# Patient Record
Sex: FEMALE | Race: BLACK OR AFRICAN AMERICAN | NOT HISPANIC OR LATINO | Employment: STUDENT | URBAN - METROPOLITAN AREA
[De-identification: names, ages, dates, MRNs, and addresses within clinical notes are randomized per-mention and may not be internally consistent; named-entity substitution may affect disease eponyms.]

---

## 2021-04-26 ENCOUNTER — PATIENT MESSAGE (OUTPATIENT)
Dept: RESEARCH | Facility: HOSPITAL | Age: 41
End: 2021-04-26

## 2021-07-16 ENCOUNTER — OFFICE VISIT (OUTPATIENT)
Dept: OBSTETRICS AND GYNECOLOGY | Facility: CLINIC | Age: 41
End: 2021-07-16
Payer: MEDICAID

## 2021-07-16 VITALS
WEIGHT: 265.31 LBS | SYSTOLIC BLOOD PRESSURE: 140 MMHG | DIASTOLIC BLOOD PRESSURE: 88 MMHG | BODY MASS INDEX: 40.34 KG/M2

## 2021-07-16 DIAGNOSIS — Z01.419 ENCOUNTER FOR WELL WOMAN EXAM WITH ROUTINE GYNECOLOGICAL EXAM: Primary | ICD-10-CM

## 2021-07-16 DIAGNOSIS — N93.0 BLEEDING AFTER INTERCOURSE: ICD-10-CM

## 2021-07-16 DIAGNOSIS — N94.6 DYSMENORRHEA: ICD-10-CM

## 2021-07-16 PROCEDURE — 99213 OFFICE O/P EST LOW 20 MIN: CPT | Mod: PBBFAC,PN | Performed by: NURSE PRACTITIONER

## 2021-07-16 PROCEDURE — 99386 PR PREVENTIVE VISIT,NEW,40-64: ICD-10-PCS | Mod: S$PBB,,, | Performed by: NURSE PRACTITIONER

## 2021-07-16 PROCEDURE — 99999 PR PBB SHADOW E&M-EST. PATIENT-LVL III: ICD-10-PCS | Mod: PBBFAC,,, | Performed by: NURSE PRACTITIONER

## 2021-07-16 PROCEDURE — 99999 PR PBB SHADOW E&M-EST. PATIENT-LVL III: CPT | Mod: PBBFAC,,, | Performed by: NURSE PRACTITIONER

## 2021-07-16 PROCEDURE — 99386 PREV VISIT NEW AGE 40-64: CPT | Mod: S$PBB,,, | Performed by: NURSE PRACTITIONER

## 2021-07-16 RX ORDER — MELOXICAM 15 MG/1
15 TABLET ORAL DAILY
Status: ON HOLD | COMMUNITY
Start: 2021-05-20 | End: 2022-09-15

## 2021-07-16 RX ORDER — KETOCONAZOLE 20 MG/ML
SHAMPOO, SUSPENSION TOPICAL
COMMUNITY
Start: 2021-03-01

## 2021-07-16 RX ORDER — ESCITALOPRAM OXALATE 10 MG/1
10 TABLET ORAL DAILY
COMMUNITY
Start: 2021-03-27 | End: 2024-01-25

## 2021-07-16 RX ORDER — IBUPROFEN 800 MG/1
800 TABLET ORAL EVERY 8 HOURS PRN
Qty: 60 TABLET | Refills: 1 | Status: SHIPPED | OUTPATIENT
Start: 2021-07-16 | End: 2022-07-16

## 2021-08-12 ENCOUNTER — HOSPITAL ENCOUNTER (EMERGENCY)
Facility: HOSPITAL | Age: 41
Discharge: HOME | End: 2021-08-12
Attending: EMERGENCY MEDICINE
Payer: MEDICAID

## 2021-08-12 VITALS
BODY MASS INDEX: 41.65 KG/M2 | DIASTOLIC BLOOD PRESSURE: 83 MMHG | HEIGHT: 65 IN | OXYGEN SATURATION: 98 % | TEMPERATURE: 98 F | HEART RATE: 102 BPM | SYSTOLIC BLOOD PRESSURE: 133 MMHG | RESPIRATION RATE: 22 BRPM | WEIGHT: 250 LBS

## 2021-08-12 DIAGNOSIS — J00 ACUTE NASOPHARYNGITIS: Primary | ICD-10-CM

## 2021-08-12 LAB
CTP QC/QA: YES
SARS-COV-2 RDRP RESP QL NAA+PROBE: NEGATIVE

## 2021-08-12 PROCEDURE — 99283 EMERGENCY DEPT VISIT LOW MDM: CPT | Mod: 25

## 2021-08-12 PROCEDURE — 99282 PR EMERGENCY DEPT VISIT,LEVEL II: ICD-10-PCS | Mod: CS,,, | Performed by: PHYSICIAN ASSISTANT

## 2021-08-12 PROCEDURE — 99282 EMERGENCY DEPT VISIT SF MDM: CPT | Mod: CS,,, | Performed by: PHYSICIAN ASSISTANT

## 2021-08-12 PROCEDURE — U0002 COVID-19 LAB TEST NON-CDC: HCPCS | Performed by: EMERGENCY MEDICINE

## 2021-08-12 RX ORDER — BENZONATATE 100 MG/1
100 CAPSULE ORAL 3 TIMES DAILY PRN
Qty: 20 CAPSULE | Refills: 0 | Status: SHIPPED | OUTPATIENT
Start: 2021-08-12 | End: 2021-08-22

## 2022-01-08 ENCOUNTER — OFFICE VISIT (OUTPATIENT)
Dept: URGENT CARE | Facility: CLINIC | Age: 42
End: 2022-01-08

## 2022-01-08 VITALS
WEIGHT: 250 LBS | DIASTOLIC BLOOD PRESSURE: 88 MMHG | HEIGHT: 65 IN | SYSTOLIC BLOOD PRESSURE: 114 MMHG | RESPIRATION RATE: 18 BRPM | BODY MASS INDEX: 41.65 KG/M2 | HEART RATE: 77 BPM | TEMPERATURE: 98 F | OXYGEN SATURATION: 97 %

## 2022-01-08 DIAGNOSIS — N76.0 VAGINOSIS: ICD-10-CM

## 2022-01-08 DIAGNOSIS — N89.8 VAGINAL ITCHING: Primary | ICD-10-CM

## 2022-01-08 LAB
BILIRUB UR QL STRIP: NEGATIVE
GLUCOSE UR QL STRIP: NEGATIVE
KETONES UR QL STRIP: NEGATIVE
LEUKOCYTE ESTERASE UR QL STRIP: NEGATIVE
PH, POC UA: 5 (ref 5–8)
POC BLOOD, URINE: POSITIVE
POC NITRATES, URINE: NEGATIVE
PROT UR QL STRIP: NEGATIVE
SP GR UR STRIP: 1.02 (ref 1–1.03)
UROBILINOGEN UR STRIP-ACNC: NORMAL (ref 0.1–1.1)

## 2022-01-08 PROCEDURE — 87481 CANDIDA DNA AMP PROBE: CPT | Mod: 59 | Performed by: NURSE PRACTITIONER

## 2022-01-08 PROCEDURE — 99204 OFFICE O/P NEW MOD 45 MIN: CPT | Mod: TIER,25,S$GLB, | Performed by: NURSE PRACTITIONER

## 2022-01-08 PROCEDURE — 87491 CHLMYD TRACH DNA AMP PROBE: CPT | Mod: 59 | Performed by: NURSE PRACTITIONER

## 2022-01-08 PROCEDURE — 87591 N.GONORRHOEAE DNA AMP PROB: CPT | Mod: 59 | Performed by: NURSE PRACTITIONER

## 2022-01-08 PROCEDURE — 87801 DETECT AGNT MULT DNA AMPLI: CPT | Performed by: NURSE PRACTITIONER

## 2022-01-08 PROCEDURE — 81003 URINALYSIS AUTO W/O SCOPE: CPT | Mod: QW,S$GLB,, | Performed by: NURSE PRACTITIONER

## 2022-01-08 PROCEDURE — 87086 URINE CULTURE/COLONY COUNT: CPT | Performed by: NURSE PRACTITIONER

## 2022-01-08 PROCEDURE — 81003 POCT URINALYSIS, DIPSTICK, MANUAL, W/O SCOPE: ICD-10-PCS | Mod: QW,S$GLB,, | Performed by: NURSE PRACTITIONER

## 2022-01-08 PROCEDURE — 99204 PR OFFICE/OUTPT VISIT, NEW, LEVL IV, 45-59 MIN: ICD-10-PCS | Mod: TIER,25,S$GLB, | Performed by: NURSE PRACTITIONER

## 2022-01-08 NOTE — PROGRESS NOTES
"Subjective:       Patient ID: Jerome Luke is a 41 y.o. female.    Vitals:  height is 5' 5" (1.651 m) and weight is 113.4 kg (250 lb). Her temperature is 97.8 °F (36.6 °C). Her blood pressure is 114/88 and her pulse is 77. Her respiration is 18 and oxygen saturation is 97%.     Chief Complaint: Vaginal Itching    Period came down with severe irritation and itching.    Vaginal Itching  The patient's primary symptoms include genital itching and vaginal discharge. Episode onset: 2 weeks ago. The problem has been unchanged. The patient is experiencing no pain. Pertinent negatives include no abdominal pain, chills, diarrhea, fever, headaches, nausea or vomiting. Nothing aggravates the symptoms. She has tried nothing for the symptoms.       Constitution: Negative for chills, sweating, fatigue, fever and generalized weakness.   Cardiovascular: Negative for chest pain, palpitations and sob on exertion.   Respiratory: Negative for chest tightness, cough, shortness of breath and wheezing.    Gastrointestinal: Negative for abdominal pain, nausea, vomiting and diarrhea.   Genitourinary: Positive for vaginal discharge and vaginal bleeding (currently on menses).   Musculoskeletal: Negative for muscle ache.   Neurological: Negative for dizziness, light-headedness and headaches.       Objective:      Physical Exam   Constitutional: She is oriented to person, place, and time.  Non-toxic appearance. She does not appear ill. No distress.   Eyes: Conjunctivae are normal. Pupils are equal, round, and reactive to light.      extraocular movement intact   Cardiovascular: Normal rate, regular rhythm, S1 normal, S2 normal, normal heart sounds and normal pulses. Exam reveals no decreased pulses.   Pulmonary/Chest: Effort normal and breath sounds normal. No accessory muscle usage. No respiratory distress. She has no decreased breath sounds. She has no wheezes. She has no rhonchi. She has no rales.   Abdominal: Bowel sounds are normal. " Soft. flat abdomenThere is no abdominal tenderness. There is no rebound, no guarding, no left CVA tenderness and no right CVA tenderness.   Lymphadenopathy:     She has no cervical adenopathy.   Neurological: She is oriented to person, place, and time.   Skin: Skin is warm, dry and not diaphoretic.   Nursing note and vitals reviewed.        Results for orders placed or performed in visit on 01/08/22   POCT Urinalysis, Dipstick, Manual, w/o Scope   Result Value Ref Range    POC Blood, Urine Positive (A) Negative    POC Bilirubin, Urine Negative Negative    POC Urobilinogen, Urine Normal 0.1 - 1.1    POC Ketones, Urine Negative Negative    POC Protein, Urine Negative Negative    POC Nitrates, Urine Negative Negative    POC Glucose, Urine Negative Negative    pH, UA 5.0 5 - 8    POC Specific Gravity, Urine 1.025 1.003 - 1.029    POC Leukocytes, Urine Negative Negative        Assessment:       1. Vaginal itching    2. Vaginosis          Plan:       Discussed  Diagnosis and treatment plan with patient who verbalized understanding and agrees with plan of care.  Patient given educational handouts supporting this diagnosis as well.  She denied any further questions or concerns at this time.  Patient exits exam room in no acute distress.    Vaginal itching  -     POCT Urinalysis, Dipstick, Manual, w/o Scope  -     Urine culture  -     Vaginosis Screen by DNA Probe    Vaginosis  -     Urine culture  -     Vaginosis Screen by DNA Probe  -     C. trachomatis/N. gonorrhoeae by AMP DNA      Patient Instructions                                        Urinary Symptoms  If your condition worsens or fails to improve we recommend that you receive another evaluation at the ER immediately or contact your PCP to discuss your concerns or return here. You must understand that you've received an urgent care treatment only and that you may be released before all your medical problems are known or treated. You the patient will arrange for  "followup care as instructed.   If you were prescribed antibiotics, please take them to full completion.    If you had cultures done it will take 3-5 days to result. We will call you with the result.   If you are are female and on BCP use additional methods to prevent pregnancy while on the antibiotics and for one cycle after.   Cranberry juice may help. Get the 100% cranberry juice and mix 4 oz of juice with 4 oz of water and drink this 8 oz glass of liquid once a day.     STD testing  You were tested for STDs on today:    As far the STD testing, we may hold off on any medications till the labs result. We will phone in medication for you if needed.  If we have decided to treat you now be sure to take all the meds as directed.  If you need more meds when the labs result we will call you and phone them in for you.  If you do test positive for STDs you should be retested in about 6 weeks again in 6 monts to ensure true negative results.    Increase condom use to prevent further occurance.  Notify sexual partners of the need for testing.  Complete ALL medication prescribed.  NO sexual intercourse for 7 days after treatment.      Today's testing will give no crediable information if you have unprotected sexual activities going forward.   Patient Education       Vaginal Discharge   The Basics   Written by the doctors and editors at [facility]   What is vaginal discharge? -- "Vaginal discharge" is the term doctors use to describe the fluid that comes out of the vagina (figure 1). Vaginal discharge is made up of cells from the vagina and cervix, bacteria, mucus, and water.  Can vaginal discharge be normal? -- Yes. Normally, women who get monthly periods can have a small amount of vaginal discharge each day. Normal vaginal discharge can be white, clear, or thick, but usually does not smell bad.  Different women can have different amounts of vaginal discharge. Also, a woman can have more or less vaginal discharge at different " times. For example, a woman might notice more vaginal discharge:  · When she is pregnant  · When she uses hormone birth control medicines  · During the 2 weeks before she gets her period  Women who have been through menopause usually have less vaginal discharge. Menopause is the time when a woman stops getting her monthly period.  When is vaginal discharge abnormal? -- Vaginal discharge is abnormal when it occurs with the following symptoms:  · Itching of the vagina or the area around the vagina  · Redness, pain, or swelling around the vagina  · Discharge that is foamy, greenish-yellow, or has blood in it  · Discharge that smells bad  · Pain when urinating or having sex  · Pain in the lower part of the belly  · Fever  What are the causes of abnormal vaginal discharge? -- Different conditions can cause abnormal vaginal discharge. The most common causes are:  · An infection in the vagina, cervix, or uterus  · A reaction to something in the vagina that a woman forgot to take out, such as a tampon or condom  · A reaction to a soap or other product that was in the vagina  · Changes in the body that occur after menopause  Should I treat abnormal vaginal discharge myself? -- No. Most doctors recommend that you do not treat abnormal vaginal discharge yourself. Treating yourself can cause your symptoms to get worse.  Should I see a doctor or nurse? -- Yes. If you have abnormal vaginal discharge, see a doctor or nurse so that he or she can figure out the cause. Your doctor or nurse will talk with you and do an exam. He or she will also take a sample of your vaginal discharge, and do lab tests on the sample to look for an infection.  How is abnormal vaginal discharge treated? -- Treatment will depend on the cause of the abnormal vaginal discharge. For example, different vaginal infections are treated with different medicines. If you have a vaginal infection, your doctor or nurse will want to figure out what type of infection  you have so that he or she can treat it with the right medicine.  If your abnormal vaginal discharge is caused by certain types of infections, your sex partner will also need to see a doctor for treatment. (You might want to stop having sex until you know what is causing your symptoms.)  Can abnormal vaginal discharge be prevented? -- Sometimes. You can help prevent abnormal vaginal discharge by:  · Using warm water and unscented non-soap cleanser to wash your vulva (the vulva is the area of skin around the outside of the vagina)  · Taking baths in plain warm water, and not using scented bath products  · Not using sprays or powders on your vagina  · Not douching (douching is when a woman puts a liquid inside her vagina to rinse it out)  · Not wiping with baby wipes or scented toilet paper after you use the toilet  All topics are updated as new evidence becomes available and our peer review process is complete.  This topic retrieved from [employer] on: Sep 21, 2021.  Topic 10257 Version 7.0  Release: 29.4.2 - C29.263  © 2021 UpToDate, Inc. and/or its affiliates. All rights reserved.  figure 1: Adult female external genitalia     This drawing shows the different parts of the genitals.  Graphic 82008 Version 9.0    Consumer Information Use and Disclaimer   This information is not specific medical advice and does not replace information you receive from your health care provider. This is only a brief summary of general information. It does NOT include all information about conditions, illnesses, injuries, tests, procedures, treatments, therapies, discharge instructions or life-style choices that may apply to you. You must talk with your health care provider for complete information about your health and treatment options. This information should not be used to decide whether or not to accept your health care provider's advice, instructions or recommendations. Only your health care provider has the knowledge and training  to provide advice that is right for you. The use of this information is governed by the [employer] End User License Agreement, available at https://www.Contur.Sigma Force/en/solutions/Wavesat/about/brian.The use of Perfect content is governed by the Perfect Terms of Use. ©2021 [employer] All rights reserved.  Copyright   © 2021 UpToDate, Inc. and/or its affiliates. All rights reserved.

## 2022-01-08 NOTE — PATIENT INSTRUCTIONS
"                                     Urinary Symptoms  If your condition worsens or fails to improve we recommend that you receive another evaluation at the ER immediately or contact your PCP to discuss your concerns or return here. You must understand that you've received an urgent care treatment only and that you may be released before all your medical problems are known or treated. You the patient will arrange for followup care as instructed.   If you were prescribed antibiotics, please take them to full completion.    If you had cultures done it will take 3-5 days to result. We will call you with the result.   If you are are female and on BCP use additional methods to prevent pregnancy while on the antibiotics and for one cycle after.   Cranberry juice may help. Get the 100% cranberry juice and mix 4 oz of juice with 4 oz of water and drink this 8 oz glass of liquid once a day.     STD testing  You were tested for STDs on today:    As far the STD testing, we may hold off on any medications till the labs result. We will phone in medication for you if needed.  If we have decided to treat you now be sure to take all the meds as directed.  If you need more meds when the labs result we will call you and phone them in for you.  If you do test positive for STDs you should be retested in about 6 weeks again in 6 monts to ensure true negative results.    Increase condom use to prevent further occurance.  Notify sexual partners of the need for testing.  Complete ALL medication prescribed.  NO sexual intercourse for 7 days after treatment.      Today's testing will give no crediable information if you have unprotected sexual activities going forward.   Patient Education       Vaginal Discharge   The Basics   Written by the doctors and editors at [facility]   What is vaginal discharge? -- "Vaginal discharge" is the term doctors use to describe the fluid that comes out of the vagina (figure 1). Vaginal discharge is made up of " cells from the vagina and cervix, bacteria, mucus, and water.  Can vaginal discharge be normal? -- Yes. Normally, women who get monthly periods can have a small amount of vaginal discharge each day. Normal vaginal discharge can be white, clear, or thick, but usually does not smell bad.  Different women can have different amounts of vaginal discharge. Also, a woman can have more or less vaginal discharge at different times. For example, a woman might notice more vaginal discharge:  · When she is pregnant  · When she uses hormone birth control medicines  · During the 2 weeks before she gets her period  Women who have been through menopause usually have less vaginal discharge. Menopause is the time when a woman stops getting her monthly period.  When is vaginal discharge abnormal? -- Vaginal discharge is abnormal when it occurs with the following symptoms:  · Itching of the vagina or the area around the vagina  · Redness, pain, or swelling around the vagina  · Discharge that is foamy, greenish-yellow, or has blood in it  · Discharge that smells bad  · Pain when urinating or having sex  · Pain in the lower part of the belly  · Fever  What are the causes of abnormal vaginal discharge? -- Different conditions can cause abnormal vaginal discharge. The most common causes are:  · An infection in the vagina, cervix, or uterus  · A reaction to something in the vagina that a woman forgot to take out, such as a tampon or condom  · A reaction to a soap or other product that was in the vagina  · Changes in the body that occur after menopause  Should I treat abnormal vaginal discharge myself? -- No. Most doctors recommend that you do not treat abnormal vaginal discharge yourself. Treating yourself can cause your symptoms to get worse.  Should I see a doctor or nurse? -- Yes. If you have abnormal vaginal discharge, see a doctor or nurse so that he or she can figure out the cause. Your doctor or nurse will talk with you and do an  exam. He or she will also take a sample of your vaginal discharge, and do lab tests on the sample to look for an infection.  How is abnormal vaginal discharge treated? -- Treatment will depend on the cause of the abnormal vaginal discharge. For example, different vaginal infections are treated with different medicines. If you have a vaginal infection, your doctor or nurse will want to figure out what type of infection you have so that he or she can treat it with the right medicine.  If your abnormal vaginal discharge is caused by certain types of infections, your sex partner will also need to see a doctor for treatment. (You might want to stop having sex until you know what is causing your symptoms.)  Can abnormal vaginal discharge be prevented? -- Sometimes. You can help prevent abnormal vaginal discharge by:  · Using warm water and unscented non-soap cleanser to wash your vulva (the vulva is the area of skin around the outside of the vagina)  · Taking baths in plain warm water, and not using scented bath products  · Not using sprays or powders on your vagina  · Not douching (douching is when a woman puts a liquid inside her vagina to rinse it out)  · Not wiping with baby wipes or scented toilet paper after you use the toilet  All topics are updated as new evidence becomes available and our peer review process is complete.  This topic retrieved from [employer] on: Sep 21, 2021.  Topic 93227 Version 7.0  Release: 29.4.2 - C29.263  © 2021 UpToDate, Inc. and/or its affiliates. All rights reserved.  figure 1: Adult female external genitalia     This drawing shows the different parts of the genitals.  Graphic 15103 Version 9.0    Consumer Information Use and Disclaimer   This information is not specific medical advice and does not replace information you receive from your health care provider. This is only a brief summary of general information. It does NOT include all information about conditions, illnesses, injuries,  tests, procedures, treatments, therapies, discharge instructions or life-style choices that may apply to you. You must talk with your health care provider for complete information about your health and treatment options. This information should not be used to decide whether or not to accept your health care provider's advice, instructions or recommendations. Only your health care provider has the knowledge and training to provide advice that is right for you. The use of this information is governed by the [employer] End User License Agreement, available at https://www.Biotronics3D.[employer]obii Technology/en/solutions/Algorithmics/about/brian.The use of [employer] content is governed by the [employer] Terms of Use. ©2021 [employer]. All rights reserved.  Copyright   © 2021 UpToDate, Inc. and/or its affiliates. All rights reserved.

## 2022-01-11 LAB — BACTERIA UR CULT: NO GROWTH

## 2022-01-13 LAB
C TRACH DNA SPEC QL NAA+PROBE: NOT DETECTED
N GONORRHOEA DNA SPEC QL NAA+PROBE: NOT DETECTED

## 2022-01-14 ENCOUNTER — TELEPHONE (OUTPATIENT)
Dept: OBSTETRICS AND GYNECOLOGY | Facility: CLINIC | Age: 42
End: 2022-01-14
Payer: MEDICAID

## 2022-01-15 ENCOUNTER — TELEPHONE (OUTPATIENT)
Dept: URGENT CARE | Facility: CLINIC | Age: 42
End: 2022-01-15
Payer: MEDICAID

## 2022-01-15 LAB
BACTERIAL VAGINOSIS DNA: NEGATIVE
CANDIDA GLABRATA DNA: NEGATIVE
CANDIDA KRUSEI DNA: NEGATIVE
CANDIDA RRNA VAG QL PROBE: NEGATIVE
T VAGINALIS RRNA GENITAL QL PROBE: NEGATIVE

## 2022-07-15 ENCOUNTER — TELEPHONE (OUTPATIENT)
Dept: SURGERY | Facility: CLINIC | Age: 42
End: 2022-07-15
Payer: MEDICAID

## 2022-07-15 DIAGNOSIS — Z12.11 SCREENING FOR COLON CANCER: Primary | ICD-10-CM

## 2022-07-15 RX ORDER — SODIUM CHLORIDE 0.9 % (FLUSH) 0.9 %
3 SYRINGE (ML) INJECTION
Status: CANCELLED | OUTPATIENT
Start: 2022-07-15

## 2022-07-15 NOTE — TELEPHONE ENCOUNTER
Called patient in reference to a referral to Colorectal Surgery for colon cancer screening. Patient verbally consented to a Colonoscopy and requested to be scheduled for a Colonoscopy on 09/15/2022Patient was advised a designated  is required on the day of the Colonoscopy to drive the patient home and the  must be at least. 18 years old.Colonoscopy Prep instructions were thoroughly explained and discussed with the patient.   It was emphasized, and reiterated to the patient, not to follow the prep instructions that comes with the Prep Kit. However, to please follow the prep instructions that will be received in the mail from the [facility]   Patient acknowledges understanding Prep instructions as explained and discussed on the phone.. Patient was advised the Colonoscopy Prep instructions discussed and explained on the phone,are being mailed out to the patient's verified address on file. Patient's address on file was verified with the patient for accuracy of mailing. Patient's medications on file was reviewed with the patient for accuracy of information. Patient denies taking  any other medications other than those listed and verified on medication profile.Patient was explained the Colonoscopy will be performed here at [facility]. Patient was further explained the Pre-Op will call one day prior to the procedure date, to discuss Pre-Op instructions;and what time to report for the Colonoscopy. The patient was given the opportunity to ask any questions about the Colonoscopy.       Jerome Javed,    You've been scheduled for a colonscopy.  Colonoscopy can find cancer and save lives.  The purpose of a colonoscopy is to look inside your colon for polyps, cancers, ulcers, and other conditions.  It is important to get a colonoscopy to test for colon cancer once you are 45-50 years of age or older since the disease usually has no symptoms.    THE MOST IMPORTANT thing to do is to  empty your colon by following the prep procedures on the following pages.  We want to help get you ready.  The doctor must be able to see in order to do the test right.  If it is dirty on the inside, your doctor may not be able to see important things, like polyps or cancer, and may even have to do the test again.    A colonoscopy is an outpatient procedure performed in the operating room with the use of anesthesia.  With a colonscopy, it's possible to detect and remove most polyps without abdominal surgery.  A colonoscopy is more accurate than x-rays in detecting polyps or early cancer.  Many other disorders of the colon may also be identified including diverticular and inflammatory bowel disease.  With any invasive procedure there are potential risks.  The two most significant are bleeding and perforation.  These are rare and usually identified during or shortly after the procedure.    The team at [facility] will assist to ensure a safe and efficient visit.    Respectfully,          [facility]   [facility]  Managed by [facility]  [address] [place] [address]  Phone:  [phone] Fax:  [phone]  [email or web address]

## 2022-07-25 RX ORDER — SODIUM, POTASSIUM,MAG SULFATES 17.5-3.13G
1 SOLUTION, RECONSTITUTED, ORAL ORAL DAILY
Qty: 1 KIT | Refills: 0 | Status: SHIPPED | OUTPATIENT
Start: 2022-07-25 | End: 2022-07-27

## 2022-07-28 ENCOUNTER — TELEPHONE (OUTPATIENT)
Dept: SURGERY | Facility: CLINIC | Age: 42
End: 2022-07-28
Payer: MEDICAID

## 2022-07-28 NOTE — TELEPHONE ENCOUNTER
Called patient to inform Suprep Bowel Prep ordered for Colonoscopy, requires prior authorization by insurance. Patient was informed the Colonoscopy Bowel Prep was changed from  Suprep to Golytely. Patient was advised to disregard the Suprep Bowel Prep Instructions that were mailed to the patient. Additionally this patient was advised the Golytely Bowel Prep Instructions are being mailed the this patient's verified mailing address. Patient acknowledged understanding which Colonoscopy Bowel Prep Instructions to follow.(Golytely) and not Suprep.

## 2022-08-01 ENCOUNTER — OFFICE VISIT (OUTPATIENT)
Dept: OBSTETRICS AND GYNECOLOGY | Facility: CLINIC | Age: 42
End: 2022-08-01
Payer: MEDICAID

## 2022-08-01 VITALS
BODY MASS INDEX: 45.29 KG/M2 | SYSTOLIC BLOOD PRESSURE: 104 MMHG | HEIGHT: 65 IN | DIASTOLIC BLOOD PRESSURE: 82 MMHG | WEIGHT: 271.81 LBS

## 2022-08-01 DIAGNOSIS — Z01.419 ENCOUNTER FOR WELL WOMAN EXAM WITH ROUTINE GYNECOLOGICAL EXAM: Primary | ICD-10-CM

## 2022-08-01 DIAGNOSIS — N90.89 VULVAR LESION: ICD-10-CM

## 2022-08-01 DIAGNOSIS — Z12.31 ENCOUNTER FOR SCREENING MAMMOGRAM FOR MALIGNANT NEOPLASM OF BREAST: ICD-10-CM

## 2022-08-01 DIAGNOSIS — N93.0 PCB (POST COITAL BLEEDING): ICD-10-CM

## 2022-08-01 PROCEDURE — 3008F PR BODY MASS INDEX (BMI) DOCUMENTED: ICD-10-PCS | Mod: CPTII,,, | Performed by: NURSE PRACTITIONER

## 2022-08-01 PROCEDURE — 99396 PREV VISIT EST AGE 40-64: CPT | Mod: S$PBB,,, | Performed by: NURSE PRACTITIONER

## 2022-08-01 PROCEDURE — 99999 PR PBB SHADOW E&M-EST. PATIENT-LVL III: ICD-10-PCS | Mod: PBBFAC,,, | Performed by: NURSE PRACTITIONER

## 2022-08-01 PROCEDURE — 3074F PR MOST RECENT SYSTOLIC BLOOD PRESSURE < 130 MM HG: ICD-10-PCS | Mod: CPTII,,, | Performed by: NURSE PRACTITIONER

## 2022-08-01 PROCEDURE — 1159F MED LIST DOCD IN RCRD: CPT | Mod: CPTII,,, | Performed by: NURSE PRACTITIONER

## 2022-08-01 PROCEDURE — 88175 CYTOPATH C/V AUTO FLUID REDO: CPT | Performed by: NURSE PRACTITIONER

## 2022-08-01 PROCEDURE — 99213 OFFICE O/P EST LOW 20 MIN: CPT | Mod: PBBFAC,PN | Performed by: NURSE PRACTITIONER

## 2022-08-01 PROCEDURE — 1160F PR REVIEW ALL MEDS BY PRESCRIBER/CLIN PHARMACIST DOCUMENTED: ICD-10-PCS | Mod: CPTII,,, | Performed by: NURSE PRACTITIONER

## 2022-08-01 PROCEDURE — 99999 PR PBB SHADOW E&M-EST. PATIENT-LVL III: CPT | Mod: PBBFAC,,, | Performed by: NURSE PRACTITIONER

## 2022-08-01 PROCEDURE — 1159F PR MEDICATION LIST DOCUMENTED IN MEDICAL RECORD: ICD-10-PCS | Mod: CPTII,,, | Performed by: NURSE PRACTITIONER

## 2022-08-01 PROCEDURE — 87624 HPV HI-RISK TYP POOLED RSLT: CPT | Performed by: NURSE PRACTITIONER

## 2022-08-01 PROCEDURE — 1160F RVW MEDS BY RX/DR IN RCRD: CPT | Mod: CPTII,,, | Performed by: NURSE PRACTITIONER

## 2022-08-01 PROCEDURE — 87529 HSV DNA AMP PROBE: CPT | Performed by: NURSE PRACTITIONER

## 2022-08-01 PROCEDURE — 3008F BODY MASS INDEX DOCD: CPT | Mod: CPTII,,, | Performed by: NURSE PRACTITIONER

## 2022-08-01 PROCEDURE — 3079F DIAST BP 80-89 MM HG: CPT | Mod: CPTII,,, | Performed by: NURSE PRACTITIONER

## 2022-08-01 PROCEDURE — 3079F PR MOST RECENT DIASTOLIC BLOOD PRESSURE 80-89 MM HG: ICD-10-PCS | Mod: CPTII,,, | Performed by: NURSE PRACTITIONER

## 2022-08-01 PROCEDURE — 3074F SYST BP LT 130 MM HG: CPT | Mod: CPTII,,, | Performed by: NURSE PRACTITIONER

## 2022-08-01 PROCEDURE — 99396 PR PREVENTIVE VISIT,EST,40-64: ICD-10-PCS | Mod: S$PBB,,, | Performed by: NURSE PRACTITIONER

## 2022-08-01 RX ORDER — PIMECROLIMUS 10 MG/G
CREAM TOPICAL
COMMUNITY
End: 2024-01-25

## 2022-08-01 RX ORDER — ONDANSETRON 4 MG/1
4-8 TABLET, ORALLY DISINTEGRATING ORAL EVERY 8 HOURS PRN
COMMUNITY
Start: 2022-04-19 | End: 2024-01-25

## 2022-08-01 RX ORDER — CLOBETASOL PROPIONATE 0.46 MG/ML
SOLUTION TOPICAL EVERY OTHER DAY
Status: ON HOLD | COMMUNITY
Start: 2022-04-29 | End: 2022-09-15

## 2022-08-01 NOTE — PROGRESS NOTES
"Chief Complaint: Well Woman Exam     HPI:      Jerome Luke is a 41 y.o.  who presents today for well woman exam. She is a vocal bounce performer.  LMP: Patient's last menstrual period was 2022.   Patient is currently sexually active. She is currently using condoms for contraception. She recently completed STD screening- negative. Ms. Luke confirms that she is safe at home.  Ms. Luke denies abnormal vaginal discharge, pelvic pain, urinary problems, or changes in appetite.  Menses: Monthly. Reports vaginal spotting/bleeding after intercourse x 2 year. Reports history of fibroids. US ordered to evaluate this last year but was never completed.     Reports 'cut' on vagina that has been present for 'years.'     Previous Pap:  NILM/ + HR HPV (2021) reports  hx of abnormal paps with negative colpo. Denies excisional procedure.  Previous Mammogram: 2021- WNL per patient  Colonoscopy scheduled for September.    Family History   Problem Relation Age of Onset    Hypertension Mother     Diabetes Maternal Aunt     Cancer Maternal Grandmother     Breast cancer Neg Hx     Colon cancer Neg Hx     Ovarian cancer Neg Hx      OB History        2    Para   2    Term   2            AB        Living           SAB        IAB        Ectopic        Multiple        Live Births                     ROS:     GENERAL: Denies unintentional weight gain or weight loss. Feeling well overall.   SKIN: Denies rash or lesions.   BREASTS: Denies pain, lumps, or nipple discharge.   ABDOMEN: Denies abdominal pain, constipation, diarrhea, nausea, vomiting, change in appetite.  URINARY: Denies frequency, dysuria, hematuria.  PSYCHIATRIC: Denies depression, anxiety or mood swings.    Physical Exam:      PHYSICAL EXAM:  /82   Ht 5' 5" (1.651 m)   Wt 123.3 kg (271 lb 13.2 oz)   LMP 2022   BMI 45.23 kg/m²   Body mass index is 45.23 kg/m².     APPEARANCE: Well nourished, well developed, in no acute " distress.  PSYCH: Appropriate mood and affect.  CARDIOVASCULAR: No edema of peripheral extremities  PELVIC: Normal external genitalia without lesions.  Normal hair distribution.  Adequate perineal body, normal urethral meatus.  Vagina moist and well rugated without lesions. + scant clear, brown discharge.  Cervix pink, without lesions, discharge or tenderness.  No significant cystocele or rectocele.  Bimanual exam shows uterus to be normal size, regular, mobile and nontender.  Adnexa without masses or tenderness.      Assessment/Plan:     Encounter for well woman exam with routine gynecological exam  -     Liquid-Based Pap Smear, Screening  -     HPV High Risk Genotypes, PCR    PCB (post coital bleeding)  -     US Pelvis Comp with Transvag NON-OB (xpd; Future; Expected date: 08/01/2022    Encounter for screening mammogram for malignant neoplasm of breast  -     Mammo Digital Screening Bilat w/ Donal; Future; Expected date: 08/01/2022    Vulvar lesion  -     HSV by Rapid PCR, Non-Blood [facility]; Vagina; Future; Expected date: 08/01/2022        Counseling:     Patient was counseled today on current ASCCP pap guidelines, the recommendation for yearly pelvic exams, healthy diet and exercise routines, breast self awareness and annual mammograms.She is to see her PCP for other health maintenance.     PCB:  Pelvic US  One was ordered last year but never completed.  EMBX   STD neg per pt- recently done with PCP.

## 2022-08-04 LAB
HPV HR 12 DNA SPEC QL NAA+PROBE: POSITIVE
HPV16 AG SPEC QL: NEGATIVE
HPV18 DNA SPEC QL NAA+PROBE: NEGATIVE
HSV1 DNA SPEC QL NAA+PROBE: NEGATIVE
HSV2 DNA SPEC QL NAA+PROBE: NEGATIVE
SPECIMEN SOURCE: NORMAL

## 2022-08-05 ENCOUNTER — TELEPHONE (OUTPATIENT)
Dept: OBSTETRICS AND GYNECOLOGY | Facility: CLINIC | Age: 42
End: 2022-08-05
Payer: MEDICAID

## 2022-08-05 LAB
FINAL PATHOLOGIC DIAGNOSIS: NORMAL
Lab: NORMAL

## 2022-08-05 NOTE — TELEPHONE ENCOUNTER
Called patient to discuss NILM pap/ HR HPV + for second year in a row. Will need colpo. All questions answered. Patient verbalized understanding.

## 2022-08-05 NOTE — TELEPHONE ENCOUNTER
Spoke to pt in regards to appointment.   ----- Message from YANETH Henderson sent at 8/5/2022 12:11 PM CDT -----  Please schedule colpo with MD.

## 2022-08-18 ENCOUNTER — PATIENT MESSAGE (OUTPATIENT)
Dept: OBSTETRICS AND GYNECOLOGY | Facility: CLINIC | Age: 42
End: 2022-08-18
Payer: MEDICAID

## 2022-09-27 ENCOUNTER — TELEPHONE (OUTPATIENT)
Dept: SURGERY | Facility: CLINIC | Age: 42
End: 2022-09-27
Payer: MEDICAID

## 2022-09-27 NOTE — TELEPHONE ENCOUNTER
Called and attempted to reviewed pathology with pt.      Colon, transverse, polyp, biopsy:   - Tubular adenoma      Pt did not answer  Recommend repeat cscope in 5 years    WIll have office reach out to pt

## 2022-10-04 ENCOUNTER — TELEPHONE (OUTPATIENT)
Dept: OBSTETRICS AND GYNECOLOGY | Facility: CLINIC | Age: 42
End: 2022-10-04
Payer: MEDICAID

## 2022-10-05 ENCOUNTER — PATIENT MESSAGE (OUTPATIENT)
Dept: OBSTETRICS AND GYNECOLOGY | Facility: CLINIC | Age: 42
End: 2022-10-05
Payer: MEDICAID

## 2023-03-23 ENCOUNTER — OFFICE VISIT (OUTPATIENT)
Dept: OBSTETRICS AND GYNECOLOGY | Facility: CLINIC | Age: 43
End: 2023-03-23
Payer: MEDICAID

## 2023-03-23 VITALS
DIASTOLIC BLOOD PRESSURE: 80 MMHG | WEIGHT: 253.75 LBS | SYSTOLIC BLOOD PRESSURE: 132 MMHG | BODY MASS INDEX: 42.23 KG/M2

## 2023-03-23 DIAGNOSIS — N93.9 ABNORMAL UTERINE BLEEDING (AUB): Primary | ICD-10-CM

## 2023-03-23 PROCEDURE — 1160F RVW MEDS BY RX/DR IN RCRD: CPT | Mod: CPTII,,, | Performed by: NURSE PRACTITIONER

## 2023-03-23 PROCEDURE — 3079F PR MOST RECENT DIASTOLIC BLOOD PRESSURE 80-89 MM HG: ICD-10-PCS | Mod: CPTII,,, | Performed by: NURSE PRACTITIONER

## 2023-03-23 PROCEDURE — 1159F PR MEDICATION LIST DOCUMENTED IN MEDICAL RECORD: ICD-10-PCS | Mod: CPTII,,, | Performed by: NURSE PRACTITIONER

## 2023-03-23 PROCEDURE — 3008F BODY MASS INDEX DOCD: CPT | Mod: CPTII,,, | Performed by: NURSE PRACTITIONER

## 2023-03-23 PROCEDURE — 99214 OFFICE O/P EST MOD 30 MIN: CPT | Mod: S$PBB,,, | Performed by: NURSE PRACTITIONER

## 2023-03-23 PROCEDURE — 99214 PR OFFICE/OUTPT VISIT, EST, LEVL IV, 30-39 MIN: ICD-10-PCS | Mod: S$PBB,,, | Performed by: NURSE PRACTITIONER

## 2023-03-23 PROCEDURE — 3075F SYST BP GE 130 - 139MM HG: CPT | Mod: CPTII,,, | Performed by: NURSE PRACTITIONER

## 2023-03-23 PROCEDURE — 99999 PR PBB SHADOW E&M-EST. PATIENT-LVL III: CPT | Mod: PBBFAC,,, | Performed by: NURSE PRACTITIONER

## 2023-03-23 PROCEDURE — 99999 PR PBB SHADOW E&M-EST. PATIENT-LVL III: ICD-10-PCS | Mod: PBBFAC,,, | Performed by: NURSE PRACTITIONER

## 2023-03-23 PROCEDURE — 3079F DIAST BP 80-89 MM HG: CPT | Mod: CPTII,,, | Performed by: NURSE PRACTITIONER

## 2023-03-23 PROCEDURE — 1160F PR REVIEW ALL MEDS BY PRESCRIBER/CLIN PHARMACIST DOCUMENTED: ICD-10-PCS | Mod: CPTII,,, | Performed by: NURSE PRACTITIONER

## 2023-03-23 PROCEDURE — 1159F MED LIST DOCD IN RCRD: CPT | Mod: CPTII,,, | Performed by: NURSE PRACTITIONER

## 2023-03-23 PROCEDURE — 99213 OFFICE O/P EST LOW 20 MIN: CPT | Mod: PBBFAC,PN | Performed by: NURSE PRACTITIONER

## 2023-03-23 PROCEDURE — 3008F PR BODY MASS INDEX (BMI) DOCUMENTED: ICD-10-PCS | Mod: CPTII,,, | Performed by: NURSE PRACTITIONER

## 2023-03-23 PROCEDURE — 3075F PR MOST RECENT SYSTOLIC BLOOD PRESS GE 130-139MM HG: ICD-10-PCS | Mod: CPTII,,, | Performed by: NURSE PRACTITIONER

## 2023-03-23 RX ORDER — ROSUVASTATIN CALCIUM 40 MG/1
40 TABLET, COATED ORAL
COMMUNITY
Start: 2022-10-10

## 2023-03-23 RX ORDER — DROSPIRENONE 4 MG/1
4 TABLET, FILM COATED ORAL DAILY
Qty: 30 TABLET | Refills: 11 | Status: ON HOLD | OUTPATIENT
Start: 2023-03-23 | End: 2024-02-07 | Stop reason: HOSPADM

## 2023-03-23 RX ORDER — CETIRIZINE HYDROCHLORIDE 10 MG/1
10 TABLET ORAL
COMMUNITY
Start: 2022-12-26 | End: 2024-02-23

## 2023-03-23 RX ORDER — NORGESTIMATE AND ETHINYL ESTRADIOL 0.25-0.035
KIT ORAL
Qty: 28 TABLET | Refills: 0 | Status: ON HOLD | OUTPATIENT
Start: 2023-03-23 | End: 2024-02-07 | Stop reason: HOSPADM

## 2023-03-23 RX ORDER — IBUPROFEN 800 MG/1
800 TABLET ORAL EVERY 6 HOURS PRN
COMMUNITY
End: 2024-01-21

## 2023-03-23 RX ORDER — NORETHINDRONE ACETATE AND ETHINYL ESTRADIOL, ETHINYL ESTRADIOL AND FERROUS FUMARATE 1MG-10(24)
1 KIT ORAL
COMMUNITY
Start: 2023-02-28 | End: 2023-03-23

## 2023-03-23 RX ORDER — NORETHINDRONE ACETATE AND ETHINYL ESTRADIOL 1MG-20(21)
1 KIT ORAL
COMMUNITY
End: 2023-03-23

## 2023-04-13 NOTE — PROGRESS NOTES
CC: AUB     HPI: Pt is a 42 y.o.  female who presents for abnormal uterine bleeding. Patient reports daily heavy bleeding since January 2023. She was seen in the ED 11 days ago. H/H 12.6/38.7.   Patient was worked up for postcoital bleeding and menorrhagia 8/2022-11/2022:   Pelvic US showed uterine fibroids  EMBX at [facility]- WNL,   Pap- NILM/ HR HPV positive-second consecutive year- did not follow up for colpo.    Patient was placed on lo-loestrin by MD at [facility] for menorrhagia which led to daily vaginal bleeding.     Patient has tried IUD, depo, and CHC previously. She is not interested in IUD or Depo. She reports severe headaches with CHC.    Denies h/o migraines with aura, VTE, Smoking. Takes HCTZ for HTN.     ROS:  GENERAL: Feeling well overall. Denies fever or chills.   ABDOMEN: No abdominal pain, constipation, diarrhea, nausea, vomiting or rectal bleeding.   URINARY: No dysuria, hematuria, or burning on urination.  REPRODUCTIVE: See HPI.   PSYCHIATRIC: Denies depression, anxiety or mood swings.    PE:   APPEARANCE: Well nourished, well developed, Black or  female in no acute distress.  PELVIC: Deferred      Diagnosis:  1. Abnormal uterine bleeding (AUB)      Plan:     Orders Placed This Encounter    CBC Auto Differential    TSH    drospirenone, contraceptive, (SLYND) 4 mg (28) Tab    norgestimate-ethinyl estradioL (ORTHO-CYCLEN) 0.25-35 mg-mcg per tablet     Pelvic US from 8/2022 shows fibroids  EMBX from [facility] 11/2022 WNL    CBC to r/o anemia   TSH  Taper with CHC to cease bleeding SHORT-TERM due to HTN   then Slynd for menorrhagia     Follow Up for Colpo for NILM/ HR HPV (neg 16 & 18) x 2 years consecutively

## 2023-11-15 ENCOUNTER — TELEPHONE (OUTPATIENT)
Dept: OBSTETRICS AND GYNECOLOGY | Facility: CLINIC | Age: 43
End: 2023-11-15
Payer: MEDICAID

## 2023-11-15 NOTE — TELEPHONE ENCOUNTER
----- Message from Alice Stout sent at 11/15/2023  1:48 PM CST -----  Hello,    I have pt being referred for cervical cancer screening.  I have scanned the referral /records in to media mgr within Epic. Please review and contact for scheduling,thanks!           Alice Broussard

## 2023-11-15 NOTE — TELEPHONE ENCOUNTER
Spoke with pt to try to schedule, but no available openings at this time. Advised her to call back in Jan/Feb to get the times she requested. The schedule will be open closer to that time.Pt Tristan.

## 2024-01-25 ENCOUNTER — OFFICE VISIT (OUTPATIENT)
Dept: OBSTETRICS AND GYNECOLOGY | Facility: CLINIC | Age: 44
End: 2024-01-25
Payer: MEDICAID

## 2024-01-25 VITALS — RESPIRATION RATE: 18 BRPM | BODY MASS INDEX: 40.98 KG/M2 | HEIGHT: 65 IN

## 2024-01-25 DIAGNOSIS — Z01.419 ENCOUNTER FOR WELL WOMAN EXAM WITH ROUTINE GYNECOLOGICAL EXAM: Primary | ICD-10-CM

## 2024-01-25 DIAGNOSIS — Z12.31 ENCOUNTER FOR SCREENING MAMMOGRAM FOR MALIGNANT NEOPLASM OF BREAST: ICD-10-CM

## 2024-01-25 DIAGNOSIS — Z86.19 HISTORY OF HPV INFECTION: ICD-10-CM

## 2024-01-25 PROCEDURE — 1160F RVW MEDS BY RX/DR IN RCRD: CPT | Mod: CPTII,,, | Performed by: NURSE PRACTITIONER

## 2024-01-25 PROCEDURE — 3008F BODY MASS INDEX DOCD: CPT | Mod: CPTII,,, | Performed by: NURSE PRACTITIONER

## 2024-01-25 PROCEDURE — 99396 PREV VISIT EST AGE 40-64: CPT | Mod: S$PBB,,, | Performed by: NURSE PRACTITIONER

## 2024-01-25 PROCEDURE — 88141 CYTOPATH C/V INTERPRET: CPT | Mod: ,,, | Performed by: PATHOLOGY

## 2024-01-25 PROCEDURE — 1159F MED LIST DOCD IN RCRD: CPT | Mod: CPTII,,, | Performed by: NURSE PRACTITIONER

## 2024-01-25 PROCEDURE — 99213 OFFICE O/P EST LOW 20 MIN: CPT | Mod: PBBFAC,PN | Performed by: NURSE PRACTITIONER

## 2024-01-25 PROCEDURE — 87624 HPV HI-RISK TYP POOLED RSLT: CPT | Performed by: NURSE PRACTITIONER

## 2024-01-25 PROCEDURE — 88175 CYTOPATH C/V AUTO FLUID REDO: CPT | Performed by: PATHOLOGY

## 2024-01-25 PROCEDURE — 99999 PR PBB SHADOW E&M-EST. PATIENT-LVL III: CPT | Mod: PBBFAC,,, | Performed by: NURSE PRACTITIONER

## 2024-01-25 NOTE — PROGRESS NOTES
"Chief Complaint: Well Woman Exam     HPI:      Jerome Luke is a 43 y.o.  who presents today for well woman exam. She is a vocal bounce performer.  LMP: Patient's last menstrual period was 2024 (exact date).   Patient is currently sexually active. She is currently using no contraception. She declines STD screening. Ms. Luke confirms that she is safe at home.  Ms. Luke denies abnormal vaginal discharge, pelvic pain, urinary problems, or changes in appetite.  Menses: Monthly. Reports vaginal spotting/bleeding after intercourse x several years.     Previous Pap: NILM/ + HR HPV (2022)- did not follow up for colpo    NILM/ + HR HPV  (2021)   Reports  hx of abnormal paps with negative colpo. Denies excisional procedure.    Previous Mammogram: Benign. TC score: 5.9% (2022)  Colonoscopy: 2022    Family History   Problem Relation Age of Onset    Hypertension Mother     Diabetes Maternal Aunt     Cancer Maternal Grandmother     Breast cancer Neg Hx     Colon cancer Neg Hx     Ovarian cancer Neg Hx      OB History          2    Para   2    Term   2            AB        Living             SAB        IAB        Ectopic        Multiple        Live Births                     ROS:     GENERAL: Denies unintentional weight gain or weight loss. Feeling well overall.   SKIN: Denies rash or lesions.   BREASTS: Denies pain, lumps, or nipple discharge.   ABDOMEN: Denies abdominal pain, constipation, diarrhea, nausea, vomiting, change in appetite.  URINARY: Denies frequency, dysuria, hematuria.  PSYCHIATRIC: Denies depression, anxiety or mood swings.    Physical Exam:      PHYSICAL EXAM:  Resp 18   Ht 5' 5" (1.651 m)   LMP 2024 (Exact Date)   BMI 40.98 kg/m²   Body mass index is 40.98 kg/m².     APPEARANCE: Well nourished, well developed, in no acute distress.  PSYCH: Appropriate mood and affect.  CARDIOVASCULAR: No edema of peripheral extremities  PELVIC: Normal external genitalia " without lesions.  Normal hair distribution.  Adequate perineal body, normal urethral meatus.  Vagina moist and well rugated without lesions. + minimal menstrual discharge.  Cervix pink, without lesions, discharge or tenderness.  No significant cystocele or rectocele.  Bimanual exam shows uterus to be normal size, regular, mobile and nontender.  Adnexa without masses or tenderness.      Assessment/Plan:     Encounter for well woman exam with routine gynecological exam  -     Liquid-Based Pap Smear, Screening  -     HPV High Risk Genotypes, PCR    Encounter for screening mammogram for malignant neoplasm of breast  -     Mammo Digital Screening Bilat w/ Donal; Future; Expected date: 01/25/2024    History of HPV infection  -     Liquid-Based Pap Smear, Screening  -     HPV High Risk Genotypes, PCR        Counseling:     Patient was counseled today on current ASCCP pap guidelines, the recommendation for yearly pelvic exams, healthy diet and exercise routines, breast self awareness and annual mammograms.She is to see her PCP for other health maintenance.     Discussed importance of F/U if HPV + again.

## 2024-01-29 ENCOUNTER — OFFICE VISIT (OUTPATIENT)
Dept: ORTHOPEDICS | Facility: CLINIC | Age: 44
End: 2024-01-29
Payer: MEDICAID

## 2024-01-29 ENCOUNTER — HOSPITAL ENCOUNTER (OUTPATIENT)
Dept: RADIOLOGY | Facility: HOSPITAL | Age: 44
Discharge: HOME | End: 2024-01-29
Attending: NURSE PRACTITIONER
Payer: MEDICAID

## 2024-01-29 VITALS
HEIGHT: 65 IN | DIASTOLIC BLOOD PRESSURE: 97 MMHG | SYSTOLIC BLOOD PRESSURE: 148 MMHG | BODY MASS INDEX: 39.87 KG/M2 | WEIGHT: 239.31 LBS | RESPIRATION RATE: 18 BRPM | HEART RATE: 74 BPM

## 2024-01-29 DIAGNOSIS — S62.102A WRIST FRACTURE, CLOSED, LEFT, INITIAL ENCOUNTER: ICD-10-CM

## 2024-01-29 DIAGNOSIS — Z12.31 ENCOUNTER FOR SCREENING MAMMOGRAM FOR MALIGNANT NEOPLASM OF BREAST: ICD-10-CM

## 2024-01-29 PROCEDURE — 99999 PR PBB SHADOW E&M-EST. PATIENT-LVL IV: CPT | Mod: PBBFAC,,,

## 2024-01-29 PROCEDURE — 77063 BREAST TOMOSYNTHESIS BI: CPT | Mod: 26,,, | Performed by: RADIOLOGY

## 2024-01-29 PROCEDURE — 1160F RVW MEDS BY RX/DR IN RCRD: CPT | Mod: CPTII,,,

## 2024-01-29 PROCEDURE — 77067 SCR MAMMO BI INCL CAD: CPT | Mod: TC

## 2024-01-29 PROCEDURE — 3008F BODY MASS INDEX DOCD: CPT | Mod: CPTII,,,

## 2024-01-29 PROCEDURE — 99214 OFFICE O/P EST MOD 30 MIN: CPT | Mod: PBBFAC,PN

## 2024-01-29 PROCEDURE — 1159F MED LIST DOCD IN RCRD: CPT | Mod: CPTII,,,

## 2024-01-29 PROCEDURE — 3077F SYST BP >= 140 MM HG: CPT | Mod: CPTII,,,

## 2024-01-29 PROCEDURE — 3080F DIAST BP >= 90 MM HG: CPT | Mod: CPTII,,,

## 2024-01-29 PROCEDURE — 99214 OFFICE O/P EST MOD 30 MIN: CPT | Mod: S$PBB,,,

## 2024-01-29 PROCEDURE — 77067 SCR MAMMO BI INCL CAD: CPT | Mod: 26,,, | Performed by: RADIOLOGY

## 2024-01-29 NOTE — PROGRESS NOTES
SUBJECTIVE:      Chief Complaint: left distal radius fracture    History of Present Illness:  Patient is a 43 y.o. right hand dominant female who presents today with complaints of left distal radius fracture.  Reports about 8 days ago she sustained a FOOSH type fall.  She went to the ER and was found to have a left distal radius fracture.  She was placed in a long arm splint and referred for orthopedic evaluation.  Denies any numbness or tingling.  Is requesting splint change.     The patient is a/an performer.    Onset of symptoms/DOI was 1/21/2024.    Symptoms are aggravated by movement.    Symptoms are alleviated by rest.    Symptoms consist of pain and swelling.    The patient rates their pain as a 4/10.    Attempted treatment(s) and/or interventions include activity modifications, rest, immobilization.     The patient denies any fevers, chills, N/V, D/C and presents for evaluation.       Past Medical History:   Diagnosis Date    Anxiety     Bipolar 1 disorder     Depression      Past Surgical History:   Procedure Laterality Date    COLONOSCOPY N/A 9/15/2022    Procedure: COLONOSCOPY;  Surgeon: Jerrell Silva MD;  [facility];  Service: General;  Laterality: N/A;     Review of patient's allergies indicates:  No Known Allergies  Social History     Social History Narrative    Not on file     Family History   Problem Relation Age of Onset    Hypertension Mother     Diabetes Maternal Aunt     Cancer Maternal Grandmother     Breast cancer Neg Hx     Colon cancer Neg Hx     Ovarian cancer Neg Hx          Current Outpatient Medications:     cetirizine (ZYRTEC) 10 MG tablet, Take 10 mg by mouth., Disp: , Rfl:     drospirenone, contraceptive, (SLYND) 4 mg (28) Tab, Take 1 tablet (4 mg total) by mouth once daily. (Patient not taking: Reported on 1/25/2024), Disp: 30 tablet, Rfl: 11    hydrochlorothiazide (HYDRODIURIL) 25 MG tablet, Take 25 mg by mouth once daily., Disp: , Rfl:     ketoconazole (NIZORAL) 2 %  "shampoo, APPLY EXTERNALLY TO THE AFFECTED AREA 2 TIMES A WEEK, Disp: , Rfl:     naproxen (NAPROSYN) 500 MG tablet, Take 1 tablet (500 mg total) by mouth 2 (two) times daily as needed (pain)., Disp: 20 tablet, Rfl: 0    norgestimate-ethinyl estradioL (ORTHO-CYCLEN) 0.25-35 mg-mcg per tablet, Take 1 tablet every 12 hours for 5 days then 1 tablet until you finish pack. Start Slynd after completion of pack (Patient not taking: Reported on 1/25/2024), Disp: 28 tablet, Rfl: 0    oxyCODONE-acetaminophen (PERCOCET) 7.5-325 mg per tablet, Take 1 tablet by mouth every 6 (six) hours as needed for Pain., Disp: 18 tablet, Rfl: 0    prochlorperazine (COMPAZINE) 10 MG tablet, Take 1 tablet (10 mg total) by mouth 3 (three) times daily as needed. (Patient not taking: Reported on 1/8/2022), Disp: 15 tablet, Rfl: 0    rosuvastatin (CRESTOR) 40 MG Tab, Take 40 mg by mouth., Disp: , Rfl:     tiZANidine (ZANAFLEX) 4 MG tablet, Take 1 tablet (4 mg total) by mouth every 6 (six) hours as needed (cough)., Disp: 20 tablet, Rfl: 0      Review of Systems:  Constitutional: no fever or chills  Eyes: no visual changes  ENT: no nasal congestion or sore throat  Respiratory: no cough or shortness of breath  Cardiovascular: no chest pain  Gastrointestinal: no nausea or vomiting, tolerating diet  Musculoskeletal: pain and soreness    OBJECTIVE:      Vital Signs (Most Recent):  Vitals:    01/29/24 1049   Resp: 18   Weight: 108.6 kg (239 lb 5 oz)   Height: 5' 5" (1.651 m)     Body mass index is 39.82 kg/m².      Physical Exam:  Constitutional: The patient appears well-developed and well-nourished. No distress.   Skin: No lesions appreciated  Head: Normocephalic and atraumatic.   Nose: Nose normal.   Ears: No deformities seen  Eyes: Conjunctivae and EOM are normal.   Neck: No tracheal deviation present.   Cardiovascular: Normal rate and intact distal pulses.    Pulmonary/Chest: Effort normal. No respiratory distress.   Abdominal: There is no guarding. "   Neurological: The patient is alert.   Psychiatric: The patient has a normal mood and affect.     Left Hand/Wrist Examination:    Splint removed.  There is moderate swelling to the wrist.  Neurovascularly intact.  Radial pulse 2 +.  Full range of motion of left elbow.  Tenderness at DRUJ you and ulnar styloid.  Able to flex all 5 digits.    Diagnostic Results:     Imaging - I independently viewed the patient's imaging as well as the radiology report.  Xrays of the patient's left wrist demonstrate acute comminuted intra-articular fracture with dorsal angulation.  Ulnar styloid fracture.    ASSESSMENT/PLAN:      1. Wrist fracture, closed, left, initial encounter  Ambulatory referral/consult to Orthopedics          Discussed case with Dr. Arnold, she has a left wrist fracture with dorsal displacement which will require ORIF of the left wrist.  Due to OR availability, we will have her follow up in [place].    [id number]- Kamini Morris PA-C, applied short arm left orthoglass splint. The patient demonstrated understanding and proper care. This was performed for 20 minutes. Activity modification- avoid use of affected limb, elevate above the heart when possible. Other cast care instructions given today.    Follow up: [place]  Xrays needed: none     All of the patient's questions were answered and the patient will contact us if they have any questions or concerns in the interim.

## 2024-01-30 ENCOUNTER — OFFICE VISIT (OUTPATIENT)
Dept: ORTHOPEDICS | Facility: CLINIC | Age: 44
End: 2024-01-30
Payer: MEDICAID

## 2024-01-30 VITALS — WEIGHT: 239 LBS | BODY MASS INDEX: 39.82 KG/M2 | HEIGHT: 65 IN

## 2024-01-30 DIAGNOSIS — S52.572A OTHER CLOSED INTRA-ARTICULAR FRACTURE OF DISTAL END OF LEFT RADIUS, INITIAL ENCOUNTER: Primary | ICD-10-CM

## 2024-01-30 DIAGNOSIS — S52.572A OTH INTARTIC FRACTURE OF LOWER END OF LEFT RADIUS, INIT: ICD-10-CM

## 2024-01-30 DIAGNOSIS — Z01.818 PRE-OP TESTING: ICD-10-CM

## 2024-01-30 PROCEDURE — 3008F BODY MASS INDEX DOCD: CPT | Mod: CPTII,,, | Performed by: ORTHOPAEDIC SURGERY

## 2024-01-30 PROCEDURE — 99214 OFFICE O/P EST MOD 30 MIN: CPT | Mod: S$PBB,,, | Performed by: ORTHOPAEDIC SURGERY

## 2024-01-30 PROCEDURE — 99212 OFFICE O/P EST SF 10 MIN: CPT | Mod: PBBFAC,PO | Performed by: ORTHOPAEDIC SURGERY

## 2024-01-30 PROCEDURE — 99999 PR PBB SHADOW E&M-EST. PATIENT-LVL II: CPT | Mod: PBBFAC,,, | Performed by: ORTHOPAEDIC SURGERY

## 2024-01-30 RX ORDER — MUPIROCIN 20 MG/G
OINTMENT TOPICAL
Status: CANCELLED | OUTPATIENT
Start: 2024-01-30

## 2024-01-30 NOTE — PROGRESS NOTES
Patient ID: Jerome Luke is a 43 y.o. female    Chief Complaint:   Chief Complaint   Patient presents with    Left Wrist - Injury, Pain       History of Present Illness:    Pleasant 43-year-old female, right-hand dominant, who is here for evaluation of left wrist pain.  She sustained a trip and fall about 9 days ago onto left wrist.  She had x-rays done at an outside facility which showed a displaced intra-articular fracture of the distal radius.  She was placed in a splint.  She presented to our [place] office yesterday for a splint change into discuss surgery.  Due to OR availability she was sent here.    PAST MEDICAL HISTORY:   Past Medical History:   Diagnosis Date    Anxiety     Bipolar 1 disorder     Depression      PAST SURGICAL HISTORY:   Past Surgical History:   Procedure Laterality Date    COLONOSCOPY N/A 9/15/2022    Procedure: COLONOSCOPY;  Surgeon: Jerrell Silva MD;  Location: [facility];  Service: General;  Laterality: N/A;     FAMILY HISTORY:   Family History   Problem Relation Age of Onset    Hypertension Mother     Diabetes Maternal Aunt     Cancer Maternal Grandmother     Breast cancer Neg Hx     Colon cancer Neg Hx     Ovarian cancer Neg Hx      SOCIAL HISTORY:   Social History     Occupational History    Not on file   Tobacco Use    Smoking status: Never     Passive exposure: Yes    Smokeless tobacco: Never   Substance and Sexual Activity    Alcohol use: Yes     Comment: casually     Drug use: Yes     Frequency: 1.0 times per week     Types: Marijuana    Sexual activity: Not Currently     Partners: Male        MEDICATIONS:   Current Outpatient Medications:     cetirizine (ZYRTEC) 10 MG tablet, Take 10 mg by mouth., Disp: , Rfl:     drospirenone, contraceptive, (SLYND) 4 mg (28) Tab, Take 1 tablet (4 mg total) by mouth once daily. (Patient not taking: Reported on 1/25/2024), Disp: 30 tablet, Rfl: 11    hydrochlorothiazide (HYDRODIURIL) 25 MG tablet, Take 25 mg by mouth once daily.,  Disp: , Rfl:     ketoconazole (NIZORAL) 2 % shampoo, APPLY EXTERNALLY TO THE AFFECTED AREA 2 TIMES A WEEK, Disp: , Rfl:     naproxen (NAPROSYN) 500 MG tablet, Take 1 tablet (500 mg total) by mouth 2 (two) times daily as needed (pain). (Patient not taking: Reported on 1/29/2024), Disp: 20 tablet, Rfl: 0    norgestimate-ethinyl estradioL (ORTHO-CYCLEN) 0.25-35 mg-mcg per tablet, Take 1 tablet every 12 hours for 5 days then 1 tablet until you finish pack. Start Slynd after completion of pack (Patient not taking: Reported on 1/25/2024), Disp: 28 tablet, Rfl: 0    oxyCODONE-acetaminophen (PERCOCET) 7.5-325 mg per tablet, Take 1 tablet by mouth every 6 (six) hours as needed for Pain., Disp: 18 tablet, Rfl: 0    prochlorperazine (COMPAZINE) 10 MG tablet, Take 1 tablet (10 mg total) by mouth 3 (three) times daily as needed. (Patient not taking: Reported on 1/8/2022), Disp: 15 tablet, Rfl: 0    rosuvastatin (CRESTOR) 40 MG Tab, Take 40 mg by mouth., Disp: , Rfl:     tiZANidine (ZANAFLEX) 4 MG tablet, Take 1 tablet (4 mg total) by mouth every 6 (six) hours as needed (cough). (Patient not taking: Reported on 1/29/2024), Disp: 20 tablet, Rfl: 0  ALLERGIES: Review of patient's allergies indicates:  No Known Allergies      Physical Exam     There were no vitals filed for this visit.  Alert and oriented to person, place and time. No acute distress. Well-groomed, not ill appearing. Pupils round and reactive, normal respiratory effort, no audible wheezing.     On exam she has in a short arm splint.  Mild soft tissue swelling.  AI and PI and ulnar nerve intact.  No evidence of open injury.  Sensation intact.  Brisk capillary refill.        Imaging:       X-Ray: I have reviewed all pertinent results/findings and my personal findings are:  Displaced comminuted intra-articular fracture with loss of volar tilt, left distal radius      Assessment & Plan    Other closed intra-articular fracture of distal end of left radius, initial  encounter         Treatment options were discussed in detail with her.  I went over her x-rays which show a displaced intra-articular fracture of the distal radius.  We discussed nonoperative and operative treatment.  We also discussed indications for surgery.  Based on her intra-articular component as well as loss of volar tilt and displacement, I do recommend surgical fixation with ORIF to restore anatomy and improve function and pain.  We discussed the risks and benefits of surgery in detail.  Due to patient's schedule she was requesting surgery next week.  I do recommend having surgery tomorrow however patient unable to make this work with her schedule.    _________________________________________________________________      Diagnosis and findings were discussed with her in lay terms. I discussed the findings and treatment options with them at length. Questions were actively sought and all questions were answered to their apparent satisfaction. We discussed the risks and benefits of surgery. We reviewed the operative indications surgical technique and potential rehabilitation involved. Risks including, but not limited to pain, bleeding, infection, damage to surrounding neurovascular structures, and other soft tissues, decreased range of motion, instability, poor cosmetic result, scarring/painful scars, poor functional result, reflex sympathetic dystrophy. We discussed as well the risk of anesthesia, DVT/PE and possible need for additional surgical intervention in lay terms. Despite the risks as explained to them, they wished to proceed with surgery. She expressed understanding and agreement with the treatment plan and understand that no guarantee of outcome is implied or expressed given.       Jerome Luke will be scheduled for the following procedure(s):     Open reduction internal fixation left distal radius    Post-Op Medications to be prescribed:   Percocet 5/325mg Take 1-2 tablets every 4-6 hours PRN  pain #28  Zofran 4mg oral disintegrating tablets every 8 hours PRN nausea/vomiting     DME/Bracing:  None  Post-op Physical Therapy to begin: 2 weeks    Medical Clearance: No  Hx of DVT,PE, anesthetic complications: No    Additional notes/concerns:  Delayed fixation, increased risk for CRPS    Opioid Disclosure  Today we discussed the reasons why the prescription is necessary as well as: the risks of addiction and overdose associated with opioid drugs and the dangers of taking opioid drugs with alcohol, benzodiazepines and other central nervous system depressants; alternative treatments that may be available; the risks associated with the use of the drugs being prescribed, specifically that opioids are highly addictive, even when taken as prescribed, that there is a risk of developing a physical or psychological dependence on the controlled dangerous substance, and that the risks of taking more opioids than prescribed or mixing sedatives, benzodiazepines or alcohol with opioids can result in fatal respiratory depression.

## 2024-01-30 NOTE — H&P (VIEW-ONLY)
Patient ID: Jerome Luke is a 43 y.o. female    Chief Complaint:   Chief Complaint   Patient presents with    Left Wrist - Injury, Pain       History of Present Illness:    Pleasant 43-year-old female, right-hand dominant, who is here for evaluation of left wrist pain.  She sustained a trip and fall about 9 days ago onto left wrist.  She had x-rays done at an outside facility which showed a displaced intra-articular fracture of the distal radius.  She was placed in a splint.  She presented to our [place] office yesterday for a splint change into discuss surgery.  Due to OR availability she was sent here.    PAST MEDICAL HISTORY:   Past Medical History:   Diagnosis Date    Anxiety     Bipolar 1 disorder     Depression      PAST SURGICAL HISTORY:   Past Surgical History:   Procedure Laterality Date    COLONOSCOPY N/A 9/15/2022    Procedure: COLONOSCOPY;  Surgeon: Jerrell Silva MD;  Location: [facility];  Service: General;  Laterality: N/A;     FAMILY HISTORY:   Family History   Problem Relation Age of Onset    Hypertension Mother     Diabetes Maternal Aunt     Cancer Maternal Grandmother     Breast cancer Neg Hx     Colon cancer Neg Hx     Ovarian cancer Neg Hx      SOCIAL HISTORY:   Social History     Occupational History    Not on file   Tobacco Use    Smoking status: Never     Passive exposure: Yes    Smokeless tobacco: Never   Substance and Sexual Activity    Alcohol use: Yes     Comment: casually     Drug use: Yes     Frequency: 1.0 times per week     Types: Marijuana    Sexual activity: Not Currently     Partners: Male        MEDICATIONS:   Current Outpatient Medications:     cetirizine (ZYRTEC) 10 MG tablet, Take 10 mg by mouth., Disp: , Rfl:     drospirenone, contraceptive, (SLYND) 4 mg (28) Tab, Take 1 tablet (4 mg total) by mouth once daily. (Patient not taking: Reported on 1/25/2024), Disp: 30 tablet, Rfl: 11    hydrochlorothiazide (HYDRODIURIL) 25 MG tablet, Take 25 mg by mouth once daily.,  Disp: , Rfl:     ketoconazole (NIZORAL) 2 % shampoo, APPLY EXTERNALLY TO THE AFFECTED AREA 2 TIMES A WEEK, Disp: , Rfl:     naproxen (NAPROSYN) 500 MG tablet, Take 1 tablet (500 mg total) by mouth 2 (two) times daily as needed (pain). (Patient not taking: Reported on 1/29/2024), Disp: 20 tablet, Rfl: 0    norgestimate-ethinyl estradioL (ORTHO-CYCLEN) 0.25-35 mg-mcg per tablet, Take 1 tablet every 12 hours for 5 days then 1 tablet until you finish pack. Start Slynd after completion of pack (Patient not taking: Reported on 1/25/2024), Disp: 28 tablet, Rfl: 0    oxyCODONE-acetaminophen (PERCOCET) 7.5-325 mg per tablet, Take 1 tablet by mouth every 6 (six) hours as needed for Pain., Disp: 18 tablet, Rfl: 0    prochlorperazine (COMPAZINE) 10 MG tablet, Take 1 tablet (10 mg total) by mouth 3 (three) times daily as needed. (Patient not taking: Reported on 1/8/2022), Disp: 15 tablet, Rfl: 0    rosuvastatin (CRESTOR) 40 MG Tab, Take 40 mg by mouth., Disp: , Rfl:     tiZANidine (ZANAFLEX) 4 MG tablet, Take 1 tablet (4 mg total) by mouth every 6 (six) hours as needed (cough). (Patient not taking: Reported on 1/29/2024), Disp: 20 tablet, Rfl: 0  ALLERGIES: Review of patient's allergies indicates:  No Known Allergies      Physical Exam     There were no vitals filed for this visit.  Alert and oriented to person, place and time. No acute distress. Well-groomed, not ill appearing. Pupils round and reactive, normal respiratory effort, no audible wheezing.     On exam she has in a short arm splint.  Mild soft tissue swelling.  AI and PI and ulnar nerve intact.  No evidence of open injury.  Sensation intact.  Brisk capillary refill.        Imaging:       X-Ray: I have reviewed all pertinent results/findings and my personal findings are:  Displaced comminuted intra-articular fracture with loss of volar tilt, left distal radius      Assessment & Plan    Other closed intra-articular fracture of distal end of left radius, initial  encounter         Treatment options were discussed in detail with her.  I went over her x-rays which show a displaced intra-articular fracture of the distal radius.  We discussed nonoperative and operative treatment.  We also discussed indications for surgery.  Based on her intra-articular component as well as loss of volar tilt and displacement, I do recommend surgical fixation with ORIF to restore anatomy and improve function and pain.  We discussed the risks and benefits of surgery in detail.  Due to patient's schedule she was requesting surgery next week.  I do recommend having surgery tomorrow however patient unable to make this work with her schedule.    _________________________________________________________________      Diagnosis and findings were discussed with her in lay terms. I discussed the findings and treatment options with them at length. Questions were actively sought and all questions were answered to their apparent satisfaction. We discussed the risks and benefits of surgery. We reviewed the operative indications surgical technique and potential rehabilitation involved. Risks including, but not limited to pain, bleeding, infection, damage to surrounding neurovascular structures, and other soft tissues, decreased range of motion, instability, poor cosmetic result, scarring/painful scars, poor functional result, reflex sympathetic dystrophy. We discussed as well the risk of anesthesia, DVT/PE and possible need for additional surgical intervention in lay terms. Despite the risks as explained to them, they wished to proceed with surgery. She expressed understanding and agreement with the treatment plan and understand that no guarantee of outcome is implied or expressed given.       Jerome Luke will be scheduled for the following procedure(s):     Open reduction internal fixation left distal radius    Post-Op Medications to be prescribed:   Percocet 5/325mg Take 1-2 tablets every 4-6 hours PRN  pain #28  Zofran 4mg oral disintegrating tablets every 8 hours PRN nausea/vomiting     DME/Bracing:  None  Post-op Physical Therapy to begin: 2 weeks    Medical Clearance: No  Hx of DVT,PE, anesthetic complications: No    Additional notes/concerns:  Delayed fixation, increased risk for CRPS    Opioid Disclosure  Today we discussed the reasons why the prescription is necessary as well as: the risks of addiction and overdose associated with opioid drugs and the dangers of taking opioid drugs with alcohol, benzodiazepines and other central nervous system depressants; alternative treatments that may be available; the risks associated with the use of the drugs being prescribed, specifically that opioids are highly addictive, even when taken as prescribed, that there is a risk of developing a physical or psychological dependence on the controlled dangerous substance, and that the risks of taking more opioids than prescribed or mixing sedatives, benzodiazepines or alcohol with opioids can result in fatal respiratory depression.

## 2024-01-31 LAB
FINAL PATHOLOGIC DIAGNOSIS: ABNORMAL
Lab: ABNORMAL

## 2024-02-01 LAB
HPV HR 12 DNA SPEC QL NAA+PROBE: POSITIVE
HPV16 AG SPEC QL: NEGATIVE
HPV18 DNA SPEC QL NAA+PROBE: NEGATIVE

## 2024-02-02 ENCOUNTER — TELEPHONE (OUTPATIENT)
Dept: OBSTETRICS AND GYNECOLOGY | Facility: CLINIC | Age: 44
End: 2024-02-02
Payer: MEDICAID

## 2024-02-02 ENCOUNTER — HOSPITAL ENCOUNTER (OUTPATIENT)
Dept: PREADMISSION TESTING | Facility: HOSPITAL | Age: 44
Discharge: HOME | End: 2024-02-02
Attending: ORTHOPAEDIC SURGERY
Payer: MEDICAID

## 2024-02-02 DIAGNOSIS — S52.572A OTHER CLOSED INTRA-ARTICULAR FRACTURE OF DISTAL END OF LEFT RADIUS, INITIAL ENCOUNTER: ICD-10-CM

## 2024-02-02 DIAGNOSIS — Z01.818 PRE-OP TESTING: ICD-10-CM

## 2024-02-02 RX ORDER — ESCITALOPRAM OXALATE 20 MG/1
20 TABLET ORAL DAILY
COMMUNITY

## 2024-02-02 NOTE — DISCHARGE INSTRUCTIONS
To confirm, Your doctor has instructed you that surgery is scheduled for:  2/4/24  Please report to [facility], Registration the morning of surgery. You must check-in and receive a wristband before going to your procedure.  [address] [place] [address] [place] [address]    Pre-Op will call the afternoon prior to surgery between 1:00 and 6:00 PM with the final arrival time.  Phone number: [phone]    PLEASE NOTE:  The surgery schedule has many variables which may affect the time of your surgery case.  Family members should be available if your surgery time changes.  Plan to be here the day of your procedure between 4-6 hours.    MEDICATIONS:  TAKE ONLY THESE MEDICATIONS WITH A SMALL SIP OF WATER THE MORNING OF YOUR PROCEDURE:    LEXAPRO        DO NOT TAKE THESE MEDICATIONS 5-7 DAYS PRIOR to your procedure or per your surgeon's request:   ASPIRIN, ALEVE, ADVIL, IBUPROFEN, FISH OIL VITAMIN E, HERBALS  (May take Tylenol)    ONLY if you are prescribed any types of blood thinners such as:  Aspirin, Coumadin, Plavix, Pradaxa, Xarelto, Aggrenox, Effient, Eliquis, Savasya, Brilinta, or any other, ask your surgeon whether you should stop taking them and how long before surgery you should stop.  You may also need to verify with the prescribing physician if it is ok to stop your medication.      INSTRUCTIONS IMPORTANT!!  Do not eat or drink anything between midnight and the time of your procedure- this includes gum, mints, and candy.  Do not smoke or drink alcoholic beverages 24 hours prior to your procedure.  Shower the night before AND the morning of your procedure with a Chlorhexidine wash such as Hibiclens or Dial antibacterial soap from the neck down.  Do not get it on your face or in your eyes.  You may use your own shampoo and face wash. This helps your skin to be as bacteria free as possible.    If you wear contact lenses, dentures, hearing aids or glasses, bring a container to put them in during  surgery and give to a family member for safe keeping.  Please leave all jewelry, piercing's and valuables at home. You must remove your false eyelashes prior to surgery.    DO NOT remove hair from the surgery site.  Do not shave the incision site unless you are given specific instructions to do so.    ONLY if you have been diagnosed with sleep apnea please bring your C-PAP machine.  ONLY if you wear home oxygen please bring your portable oxygen tank the day of your procedure.  ONLY if you have a history of OPEN HEART SURGERY you will need a clearance from your Cardiologist per Anesthesia.      ONLY for patients requiring bowel prep, written instructions will be given by your doctor's office.  ONLY if you have a neuro stimulator, please bring the controller with you the morning of surgery  ONLY if a type and screen test is needed before surgery, please return:  If your doctor has scheduled you for an overnight stay, bring a small overnight bag with any personal items you need.  Make arrangements in advance for transportation home by a responsible adult. You can not go home in an uber or a cab per hospital policy.  It is not safe to drive a vehicle during the 24 hours after anesthesia.          All  facilities and properties are tobacco free.  Smoking is NOT allowed.   If you have any questions about these instructions, call Pre-Op Admit  Nursing at [phone] or the Pre-Op Day Surgery Unit at [phone].

## 2024-02-02 NOTE — TELEPHONE ENCOUNTER
----- Message from YANETH Henderson sent at 2/2/2024 12:12 PM CST -----  Please call patient and schedule colpo with MD.

## 2024-02-02 NOTE — TELEPHONE ENCOUNTER
Called patient to discuss LSIL/ HR HPV + (neg 16 &18) pap. Discussed importance of colpo. Advised to take ibuprofen prior to procedure. All questions answered to patient satisfaction. ANTON.

## 2024-02-05 ENCOUNTER — TELEPHONE (OUTPATIENT)
Dept: OBSTETRICS AND GYNECOLOGY | Facility: CLINIC | Age: 44
End: 2024-02-05
Payer: MEDICAID

## 2024-02-05 NOTE — TELEPHONE ENCOUNTER
Called patient about medication for colpo .Patient was asking to get medication for a surgery (wrist) that isn't obgyn relation.advised patient to contact physicians who is doing surgery to see what she can and can not take for her surgery . Patient hung up.     ----- Message from Brianna Cates LPN sent at 2/5/2024  1:09 PM CST -----  Can you see if Joseph will give her something since she will be doing her [place]  ----- Message -----  From: Maryjane Hardy  Sent: 2/5/2024  12:14 PM CST  To: [facility] Staff    Name of Who is calling :  CYNTHIA ZAVALA [[id number]]      What is the request in detail:    Pt stated that she is in a lot of pain and was wondering if some pain medicine can be prescribed to her before the surgery. Please assist     Can the clinic reply by [facility]:  No           What number to call back if not in [facility]:[phone]

## 2024-02-06 ENCOUNTER — ANESTHESIA EVENT (OUTPATIENT)
Dept: SURGERY | Facility: HOSPITAL | Age: 44
End: 2024-02-06
Payer: MEDICAID

## 2024-02-06 DIAGNOSIS — S52.572A OTHER CLOSED INTRA-ARTICULAR FRACTURE OF DISTAL END OF LEFT RADIUS, INITIAL ENCOUNTER: Primary | ICD-10-CM

## 2024-02-06 RX ORDER — ONDANSETRON 4 MG/1
4 TABLET, ORALLY DISINTEGRATING ORAL EVERY 8 HOURS PRN
Qty: 30 TABLET | Refills: 0 | Status: SHIPPED | OUTPATIENT
Start: 2024-02-06

## 2024-02-06 RX ORDER — OXYCODONE AND ACETAMINOPHEN 5; 325 MG/1; MG/1
1 TABLET ORAL EVERY 6 HOURS PRN
Qty: 28 TABLET | Refills: 0 | Status: SHIPPED | OUTPATIENT
Start: 2024-02-06 | End: 2024-02-23 | Stop reason: SDUPTHER

## 2024-02-06 RX ORDER — IBUPROFEN 600 MG/1
600 TABLET ORAL 3 TIMES DAILY
Qty: 90 TABLET | Refills: 0 | Status: SHIPPED | OUTPATIENT
Start: 2024-02-06

## 2024-02-07 ENCOUNTER — ANESTHESIA (OUTPATIENT)
Dept: SURGERY | Facility: HOSPITAL | Age: 44
End: 2024-02-07
Payer: MEDICAID

## 2024-02-07 ENCOUNTER — HOSPITAL ENCOUNTER (OUTPATIENT)
Facility: HOSPITAL | Age: 44
Discharge: HOME | End: 2024-02-07
Attending: ORTHOPAEDIC SURGERY | Admitting: ORTHOPAEDIC SURGERY
Payer: MEDICAID

## 2024-02-07 DIAGNOSIS — S52.572A OTH INTARTIC FRACTURE OF LOWER END OF LEFT RADIUS, INIT: ICD-10-CM

## 2024-02-07 DIAGNOSIS — Z01.818 PRE-OP TESTING: ICD-10-CM

## 2024-02-07 DIAGNOSIS — S52.572A OTHER CLOSED INTRA-ARTICULAR FRACTURE OF DISTAL END OF LEFT RADIUS, INITIAL ENCOUNTER: ICD-10-CM

## 2024-02-07 LAB
B-HCG UR QL: NEGATIVE
CTP QC/QA: YES

## 2024-02-07 PROCEDURE — C9290 INJ, BUPIVACAINE LIPOSOME: HCPCS | Performed by: ANESTHESIOLOGY

## 2024-02-07 PROCEDURE — 64415 NJX AA&/STRD BRCH PLXS IMG: CPT | Mod: 59,RT | Performed by: ANESTHESIOLOGY

## 2024-02-07 PROCEDURE — C1769 GUIDE WIRE: HCPCS | Performed by: ORTHOPAEDIC SURGERY

## 2024-02-07 PROCEDURE — 63600175 PHARM REV CODE 636 W HCPCS: Performed by: NURSE ANESTHETIST, CERTIFIED REGISTERED

## 2024-02-07 PROCEDURE — 71000033 HC RECOVERY, INTIAL HOUR: Performed by: ORTHOPAEDIC SURGERY

## 2024-02-07 PROCEDURE — 25000003 PHARM REV CODE 250: Performed by: ANESTHESIOLOGY

## 2024-02-07 PROCEDURE — 37000008 HC ANESTHESIA 1ST 15 MINUTES: Performed by: ORTHOPAEDIC SURGERY

## 2024-02-07 PROCEDURE — 36000710: Performed by: ORTHOPAEDIC SURGERY

## 2024-02-07 PROCEDURE — 37000009 HC ANESTHESIA EA ADD 15 MINS: Performed by: ORTHOPAEDIC SURGERY

## 2024-02-07 PROCEDURE — 71000039 HC RECOVERY, EACH ADD'L HOUR: Performed by: ORTHOPAEDIC SURGERY

## 2024-02-07 PROCEDURE — 71000015 HC POSTOP RECOV 1ST HR: Performed by: ORTHOPAEDIC SURGERY

## 2024-02-07 PROCEDURE — 99900031 HC PATIENT EDUCATION (STAT)

## 2024-02-07 PROCEDURE — 25000003 PHARM REV CODE 250: Performed by: ORTHOPAEDIC SURGERY

## 2024-02-07 PROCEDURE — 63600175 PHARM REV CODE 636 W HCPCS: Mod: JZ,JG | Performed by: ANESTHESIOLOGY

## 2024-02-07 PROCEDURE — 63600175 PHARM REV CODE 636 W HCPCS: Performed by: ANESTHESIOLOGY

## 2024-02-07 PROCEDURE — 25000003 PHARM REV CODE 250: Performed by: NURSE ANESTHETIST, CERTIFIED REGISTERED

## 2024-02-07 PROCEDURE — C1713 ANCHOR/SCREW BN/BN,TIS/BN: HCPCS | Performed by: ORTHOPAEDIC SURGERY

## 2024-02-07 PROCEDURE — 27200651 HC AIRWAY, LMA: Performed by: ANESTHESIOLOGY

## 2024-02-07 PROCEDURE — 63600175 PHARM REV CODE 636 W HCPCS: Performed by: ORTHOPAEDIC SURGERY

## 2024-02-07 PROCEDURE — 81025 URINE PREGNANCY TEST: CPT | Performed by: ANESTHESIOLOGY

## 2024-02-07 PROCEDURE — 25609 OPTX DST RD XART FX/EP SEP3+: CPT | Mod: LT,,, | Performed by: ORTHOPAEDIC SURGERY

## 2024-02-07 PROCEDURE — 94799 UNLISTED PULMONARY SVC/PX: CPT | Mod: XB

## 2024-02-07 PROCEDURE — 36000711: Performed by: ORTHOPAEDIC SURGERY

## 2024-02-07 DEVICE — IMPLANTABLE DEVICE: Type: IMPLANTABLE DEVICE | Site: WRIST | Status: FUNCTIONAL

## 2024-02-07 RX ORDER — PHENYLEPHRINE HYDROCHLORIDE 10 MG/ML
INJECTION INTRAVENOUS
Status: DISCONTINUED | OUTPATIENT
Start: 2024-02-07 | End: 2024-02-07

## 2024-02-07 RX ORDER — FENTANYL CITRATE 50 UG/ML
25-200 INJECTION, SOLUTION INTRAMUSCULAR; INTRAVENOUS
Status: DISCONTINUED | OUTPATIENT
Start: 2024-02-07 | End: 2024-02-07 | Stop reason: HOSPADM

## 2024-02-07 RX ORDER — ONDANSETRON HYDROCHLORIDE 2 MG/ML
INJECTION, SOLUTION INTRAVENOUS
Status: DISCONTINUED | OUTPATIENT
Start: 2024-02-07 | End: 2024-02-07

## 2024-02-07 RX ORDER — OXYCODONE HYDROCHLORIDE 5 MG/1
5 TABLET ORAL
Status: DISCONTINUED | OUTPATIENT
Start: 2024-02-07 | End: 2024-02-07 | Stop reason: HOSPADM

## 2024-02-07 RX ORDER — SODIUM CHLORIDE 0.9 % (FLUSH) 0.9 %
3 SYRINGE (ML) INJECTION
Status: DISCONTINUED | OUTPATIENT
Start: 2024-02-07 | End: 2024-02-07 | Stop reason: HOSPADM

## 2024-02-07 RX ORDER — KETOROLAC TROMETHAMINE 30 MG/ML
INJECTION, SOLUTION INTRAMUSCULAR; INTRAVENOUS
Status: DISCONTINUED | OUTPATIENT
Start: 2024-02-07 | End: 2024-02-07

## 2024-02-07 RX ORDER — BUPIVACAINE HYDROCHLORIDE 5 MG/ML
INJECTION, SOLUTION EPIDURAL; INTRACAUDAL
Status: COMPLETED | OUTPATIENT
Start: 2024-02-07 | End: 2024-02-07

## 2024-02-07 RX ORDER — ONDANSETRON HYDROCHLORIDE 2 MG/ML
4 INJECTION, SOLUTION INTRAVENOUS DAILY PRN
Status: DISCONTINUED | OUTPATIENT
Start: 2024-02-07 | End: 2024-02-07 | Stop reason: HOSPADM

## 2024-02-07 RX ORDER — LIDOCAINE HYDROCHLORIDE 20 MG/ML
INJECTION INTRAVENOUS
Status: DISCONTINUED | OUTPATIENT
Start: 2024-02-07 | End: 2024-02-07

## 2024-02-07 RX ORDER — ACETAMINOPHEN 10 MG/ML
INJECTION, SOLUTION INTRAVENOUS
Status: DISCONTINUED | OUTPATIENT
Start: 2024-02-07 | End: 2024-02-07

## 2024-02-07 RX ORDER — HYDROCODONE BITARTRATE AND ACETAMINOPHEN 5; 325 MG/1; MG/1
1 TABLET ORAL EVERY 4 HOURS PRN
Status: CANCELLED | OUTPATIENT
Start: 2024-02-07

## 2024-02-07 RX ORDER — OXYCODONE HYDROCHLORIDE 10 MG/1
10 TABLET ORAL EVERY 4 HOURS PRN
Status: CANCELLED | OUTPATIENT
Start: 2024-02-07

## 2024-02-07 RX ORDER — ONDANSETRON 4 MG/1
8 TABLET, ORALLY DISINTEGRATING ORAL EVERY 8 HOURS PRN
Status: CANCELLED | OUTPATIENT
Start: 2024-02-07

## 2024-02-07 RX ORDER — MIDAZOLAM HYDROCHLORIDE 1 MG/ML
2 INJECTION INTRAMUSCULAR; INTRAVENOUS
Status: DISCONTINUED | OUTPATIENT
Start: 2024-02-07 | End: 2024-02-07 | Stop reason: HOSPADM

## 2024-02-07 RX ORDER — LIDOCAINE HYDROCHLORIDE 10 MG/ML
1 INJECTION, SOLUTION EPIDURAL; INFILTRATION; INTRACAUDAL; PERINEURAL ONCE
Status: DISCONTINUED | OUTPATIENT
Start: 2024-02-07 | End: 2024-02-07 | Stop reason: HOSPADM

## 2024-02-07 RX ORDER — SODIUM CHLORIDE 0.9 % (FLUSH) 0.9 %
10 SYRINGE (ML) INJECTION
Status: DISCONTINUED | OUTPATIENT
Start: 2024-02-07 | End: 2024-02-07 | Stop reason: HOSPADM

## 2024-02-07 RX ORDER — FENTANYL CITRATE 50 UG/ML
25 INJECTION, SOLUTION INTRAMUSCULAR; INTRAVENOUS EVERY 5 MIN PRN
Status: DISCONTINUED | OUTPATIENT
Start: 2024-02-07 | End: 2024-02-07 | Stop reason: HOSPADM

## 2024-02-07 RX ORDER — DEXAMETHASONE SODIUM PHOSPHATE 4 MG/ML
INJECTION, SOLUTION INTRA-ARTICULAR; INTRALESIONAL; INTRAMUSCULAR; INTRAVENOUS; SOFT TISSUE
Status: DISCONTINUED | OUTPATIENT
Start: 2024-02-07 | End: 2024-02-07

## 2024-02-07 RX ORDER — CEFAZOLIN SODIUM 2 G/50ML
2 SOLUTION INTRAVENOUS
Status: COMPLETED | OUTPATIENT
Start: 2024-02-07 | End: 2024-02-07

## 2024-02-07 RX ORDER — PROPOFOL 10 MG/ML
VIAL (ML) INTRAVENOUS
Status: DISCONTINUED | OUTPATIENT
Start: 2024-02-07 | End: 2024-02-07

## 2024-02-07 RX ORDER — MUPIROCIN 20 MG/G
OINTMENT TOPICAL
Status: DISCONTINUED | OUTPATIENT
Start: 2024-02-07 | End: 2024-02-07 | Stop reason: HOSPADM

## 2024-02-07 RX ADMIN — FENTANYL CITRATE 100 MCG: 50 INJECTION, SOLUTION INTRAMUSCULAR; INTRAVENOUS at 09:02

## 2024-02-07 RX ADMIN — MIDAZOLAM HYDROCHLORIDE 2 MG: 1 INJECTION INTRAMUSCULAR; INTRAVENOUS at 09:02

## 2024-02-07 RX ADMIN — OXYCODONE HYDROCHLORIDE 5 MG: 5 TABLET ORAL at 12:02

## 2024-02-07 RX ADMIN — PROPOFOL 300 MG: 10 INJECTION, EMULSION INTRAVENOUS at 10:02

## 2024-02-07 RX ADMIN — ONDANSETRON 4 MG: 2 INJECTION INTRAMUSCULAR; INTRAVENOUS at 10:02

## 2024-02-07 RX ADMIN — GLYCOPYRROLATE 0.2 MG: 0.2 INJECTION, SOLUTION INTRAMUSCULAR; INTRAVITREAL at 09:02

## 2024-02-07 RX ADMIN — MUPIROCIN: 20 OINTMENT TOPICAL at 09:02

## 2024-02-07 RX ADMIN — SODIUM CHLORIDE, SODIUM GLUCONATE, SODIUM ACETATE, POTASSIUM CHLORIDE AND MAGNESIUM CHLORIDE: 526; 502; 368; 37; 30 INJECTION, SOLUTION INTRAVENOUS at 09:02

## 2024-02-07 RX ADMIN — CEFAZOLIN SODIUM 2 G: 2 SOLUTION INTRAVENOUS at 10:02

## 2024-02-07 RX ADMIN — FENTANYL CITRATE 50 MCG: 50 INJECTION, SOLUTION INTRAMUSCULAR; INTRAVENOUS at 10:02

## 2024-02-07 RX ADMIN — LIDOCAINE HYDROCHLORIDE 100 MG: 20 INJECTION, SOLUTION INTRAVENOUS at 10:02

## 2024-02-07 RX ADMIN — BUPIVACAINE 10 ML: 13.3 INJECTION, SUSPENSION, LIPOSOMAL INFILTRATION at 09:02

## 2024-02-07 RX ADMIN — DEXAMETHASONE SODIUM PHOSPHATE 4 MG: 4 INJECTION, SOLUTION INTRA-ARTICULAR; INTRALESIONAL; INTRAMUSCULAR; INTRAVENOUS; SOFT TISSUE at 10:02

## 2024-02-07 RX ADMIN — KETOROLAC TROMETHAMINE 30 MG: 30 INJECTION, SOLUTION INTRAMUSCULAR; INTRAVENOUS at 11:02

## 2024-02-07 RX ADMIN — BUPIVACAINE HYDROCHLORIDE 10 ML: 5 INJECTION, SOLUTION EPIDURAL; INTRACAUDAL; PERINEURAL at 09:02

## 2024-02-07 RX ADMIN — PHENYLEPHRINE HYDROCHLORIDE 200 MCG: 10 INJECTION INTRAVENOUS at 11:02

## 2024-02-07 RX ADMIN — PHENYLEPHRINE HYDROCHLORIDE 100 MCG: 10 INJECTION INTRAVENOUS at 10:02

## 2024-02-07 RX ADMIN — ACETAMINOPHEN 1000 MG: 10 INJECTION, SOLUTION INTRAVENOUS at 10:02

## 2024-02-07 RX ADMIN — FENTANYL CITRATE 50 MCG: 50 INJECTION, SOLUTION INTRAMUSCULAR; INTRAVENOUS at 11:02

## 2024-02-07 RX ADMIN — SODIUM CHLORIDE, SODIUM GLUCONATE, SODIUM ACETATE, POTASSIUM CHLORIDE AND MAGNESIUM CHLORIDE: 526; 502; 368; 37; 30 INJECTION, SOLUTION INTRAVENOUS at 10:02

## 2024-02-07 NOTE — TRANSFER OF CARE
Anesthesia Transfer of Care Note    Patient: Jerome Luke    Procedure(s) Performed: Procedure(s) (LRB):  ORIF, FRACTURE, RADIUS, DISTAL (Left)    Patient location: PACU    Anesthesia Type: general    Transport from OR: Transported from OR on 2-3 L/min O2 by NC with adequate spontaneous ventilation    Post pain: adequate analgesia    Post assessment: no apparent anesthetic complications    Post vital signs: stable    Level of consciousness: awake    Nausea/Vomiting: no nausea/vomiting    Complications: none    Transfer of care protocol was followed      Last vitals: Visit Vitals  /67   Pulse 80   Temp 36.3 °C (97.3 °F) (Skin)   Resp 15   Wt 108.4 kg (239 lb)   LMP 01/21/2024 (Exact Date)   SpO2 (!) 94%   Breastfeeding No   BMI 39.77 kg/m²

## 2024-02-07 NOTE — PLAN OF CARE
VSS. NAD. Resp E/U. Calm and cooperative. Speech appropriate. Tolerating clear liquids. Denies nausea. Pain controlled. IV patent. No swelling or redness. Dressing and sling to lt wrist CDI. Ice applied. Family notified of patient status. All safety measures taken. Bed in low position. Bedrails up x2. Bed locked. Cleared by Anesthesia.

## 2024-02-07 NOTE — OP NOTE
02/07/2024    PREOPERATIVE DIAGNOSIS:      Left intra-articular distal radius fracture    POSTOPERATIVE DIAGNOSIS: Same    PROCEDURE:       Open reduction internal fixation left distal radius fracture, 3+ parts      SURGEON: Arturo Arnold MD    ASSISTANT: Ulices [facility]    He was present and scrubbed for the entirety of the procedure. They were required for patient positioning, retraction, insertion of implants and closure. Without him I would have been unable to safely perform the case due to only one scrub tech available.     ANESTHESIA:  general/regional     ESTIMATED BLOOD LOSS:  15 mL    INDICATIONS:  Jerome Luke is a 43 y.o. year-old female who presented to my clinic with a chief complaint of left  wrist pain after a fall from standing height. Imaging revealed a  a displaced  intra-articular distal radius fracture.  Ultimately we discussed open reduction internal fixation to improve function.     We discussed the risks and benefits of the surgery in detail including stiffness, CRPS, damage to surrounding neurovascular structures, failure of fixation as well as need for revision surgery. Despite these risks they elected to proceed.       COMPONENTS USED:      Implant Name Type Inv. Item Serial No.  Lot No. LRB No. Used Action   4-hole l narrow vdr plate    [employer]EOMED  Left 1 Implanted   2.4 x 24n nl screw    [employer]ED  Left 1 Implanted and Explanted   2.4 x 18 locking screw    [employer]ED  Left 3 Implanted   2.7 x 12 NL screw    [employer]ED  Left 2 Implanted   2.7x 14 nl screw    [employer]ED  Left 1 Implanted   2.4 x 16 locking screw    OSTE[employer]ED  Left 2 Implanted   2.4 x 20 locking screw    OSTE[employer]ED  Left 1 Implanted         DESCRIPTION OF PROCEDURE:  The patient was seen in the pre-operative area where consents were verified and the surgical site marked. They did receive a preoperative block for intra-operative and postoperative analgesia. The patient was taken to the Operating Room where  anesthesia was administered by the Anesthesia Department. She was then placed in the   Supine  position and all superficial neurovascular structures were well padded.  The left  upper extremity  was then sterilely prepped and draped in the normal fashion.  Preoperative antibiotics were administered.  A time-out was performed verifying the procedure and laterality, all agreed and elected to proceed as planned.     Esmarch tourniquet was used to exsanguinate the limb and the tourniquet inflated to 250 mmHg.  We began by making a standard FCR volar incision.  FCR tendon was retracted out of harm's way and protected the neurovascular bundle.  The FCR sheath was incised and the deep fascia was also incised in line with the incision.  The pronator quadratus was reflected off the volar distal radius in an L-shaped fashion.  The fracture had some fibrous tissue and scar.  This was debrided using  curette and rongeur.  The dorsal periosteum was also released and the distal segment was mobilized.  The fracture was intra-articular involving the radial styloid as well as the lunate facet.  We used a laminar  in the DRUJ to restore our coronal alignment.  Using volar translation we restored our distal radius on the sagittal plane.  We used a 2-0 K-wire for provisional fixation.  We then used a [employer]oMed narrow locking plate volarly.  We got this on the bone appropriately and placed K-wires for provisional fixation.  Distally we placed a cortical screw to suck the plate down to bone.  The  proximal aspect of the plate was left off the shaft for later restoration of volar tilt.  Distally we placed multiple adjacent locking screws into the distal cluster.  Proximally we placed 3 cortical screws restoring volar tilt.  We then exchanged our previous distal cortical screw for a locking screw and placed 2 radial styloid screws on AP  x-ray.  Final x-rays were taken.  We are overall satisfied with our length alignment rotation  and joint congruency.  Copious irrigation was performed.  Subcutaneous layer was closed with Vicryl and the skin with nylon.  Volar slab splint was applied.  She tolerated the procedure well.    Disposition:        Patient will be  nonweightbearing.  Follow up 2 weeks with x-rays and  start therapy with removable brace    Arturo Arnold MD

## 2024-02-07 NOTE — ANESTHESIA PROCEDURE NOTES
Intubation    Date/Time: 2/7/2024 10:08 AM    Performed by: Julio Marino CRNA  Authorized by: Elham Romero MD    Intubation:     Induction:  Intravenous    Intubated:  N/a    Mask Ventilation:  N/a    Attempts:  1    Attempted By:  CRNA    Difficult Airway Encountered?: No      Complications:  None    Airway Device:  Supraglottic airway/LMA    Airway Device Size:  3.0    Style/Cuff Inflation:  Cuffed (inflated to minimal occlusive pressure)    Placement Verified By:  Capnometry    Complicating Factors:  None

## 2024-02-07 NOTE — PLAN OF CARE
Medications delivered to bedside.  Dressing to left wrist remains clean, dry and intact and in a sling.  Patient unable to move fingers at this time secondary to exparel block but patient able to feel me touching her fingers. Discharge instructions given to pt and family/friend, verbalized understanding and questions answered. Handouts provided. Belongings given back to pt. IV removed- catheter intact. Discharge via wheelchair.

## 2024-02-07 NOTE — BRIEF OP NOTE
Count includes the [facility]  Brief Operative Note    Surgery Date: 2/7/2024     Surgeon(s) and Role:     * Arturo Arnold MD - Primary    Assisting Surgeon: None    Pre-op Diagnosis:  Other closed intra-articular fracture of distal end of left radius, initial encounter [S52.572A]  Pre-op testing [Z01.818]    Post-op Diagnosis:  Post-Op Diagnosis Codes:     * Other closed intra-articular fracture of distal end of left radius, initial encounter [S52.572A]     * Pre-op testing [Z01.818]    Procedure(s) (LRB):  ORIF, FRACTURE, RADIUS, DISTAL (Left)    Anesthesia: Choice    Operative Findings: displaced intra-articular distal radius fx     Estimated Blood Loss: * No values recorded between 2/7/2024 10:27 AM and 2/7/2024 11:16 AM *         Specimens:   Specimen (24h ago, onward)      None              Discharge Note    OUTCOME: Patient tolerated treatment/procedure well without complication and is now ready for discharge.    DISPOSITION: Home or Self Care    FINAL DIAGNOSIS:  <principal problem not specified>    FOLLOWUP: In clinic    DISCHARGE INSTRUCTIONS:  No discharge procedures on file.

## 2024-02-07 NOTE — PLAN OF CARE
Patient ready for surgery. Surgery and anesthesia consents signed. Educated on incentive spirometer use. Belongings in pre-op locker. DAD set up with text message notifications.

## 2024-02-07 NOTE — ANESTHESIA PREPROCEDURE EVALUATION
02/07/2024  Jerome Luke is a 43 y.o., female.      Pre-op Assessment          Review of Systems  Psych:  Psychiatric History                  Physical Exam  General: Well nourished        Anesthesia Plan  Type of Anesthesia, risks & benefits discussed:    Anesthesia Type: Gen Supraglottic Airway  Intra-op Monitoring Plan: Standard ASA Monitors  Post Op Pain Control Plan: multimodal analgesia, peripheral nerve block and IV/PO Opioids PRN  Induction:  IV  Informed Consent: Informed consent signed with the Patient and all parties understand the risks and agree with anesthesia plan.  All questions answered.   ASA Score: 2  Day of Surgery Review of History & Physical: H&P Update referred to the surgeon/provider.    Ready For Surgery From Anesthesia Perspective.     .

## 2024-02-07 NOTE — ANESTHESIA PROCEDURE NOTES
Peripheral Block    Patient location during procedure: pre-op   Block not for primary anesthetic.  Reason for block: at surgeon's request and post-op pain management   Post-op Pain Location: left wrist   Start time: 2/7/2024 9:01 AM  Timeout: 2/7/2024 9:00 AM   End time: 2/7/2024 9:05 AM    Staffing  Authorizing Provider: Elham Romero MD  Performing Provider: Elham Romero MD    Staffing  Performed by: Elham Romero MD  Authorized by: Elham Romero MD    Preanesthetic Checklist  Completed: patient identified, IV checked, site marked, risks and benefits discussed, surgical consent, monitors and equipment checked, pre-op evaluation and timeout performed  Peripheral Block  Patient position: supine  Prep: ChloraPrep  Patient monitoring: heart rate, cardiac monitor, continuous pulse ox, continuous capnometry and frequent blood pressure checks  Block type: supraclavicular  Laterality: left  Injection technique: single shot  Needle  Needle type: Stimuplex   Needle gauge: 22 G  Needle length: 2 in  Needle localization: anatomical landmarks and ultrasound guidance   -ultrasound image captured on disc.  Assessment  Injection assessment: negative aspiration, negative parasthesia and local visualized surrounding nerve  Paresthesia pain: none  Heart rate change: no  Slow fractionated injection: yes  Pain Tolerance: comfortable throughout block and no complaints  Medications:    Medications: bupivacaine (pf) (MARCAINE) injection 0.5% - Perineural   10 mL - 2/7/2024 9:05:00 AM    Additional Notes  VSS.  [facility] RN monitoring vitals throughout procedure.  Patient tolerated procedure well.     Exparel 10mL

## 2024-02-07 NOTE — DISCHARGE INSTRUCTIONS
[facility] Distal Radius Post-op Instructions    Wound Care    Maintain your operative dressing, loosen bandage if significant swelling occurs.  Keep splint on at all times. Keep dry and clean.      Icing    Icing is very important for the first 5-7 days after surgery.   Use ice packs every 2 hours for 20 minutes daily until your first postoperative visit.       Activity    Elevate your wrist as much as possible.   You may work on gentle range of motion of the hand and fingers.   No lifting with the operative arm.      Medications    Do not drive a car or operative heavy machinery while taking narcotics.   You have been prescribed a narcotic (either Norco or Percocet) for pain control. This is to be used for a short time period.   Take 1-2 tablets every 4-6 hours as needed  Max of 12 pills per day  Plan on using for 2-5 days, depending on level of pain.  Do not take additional Tylenol (Acetaminophen) while taking Percocet.  Common side effects include nausea, drowsiness and constipation. Take medication with food to decrease side effects.   Ibuprofen (600-800mg) may be taken in between the narcotic medication.   You should resume your normal medications for other conditions the day after surgery. You may not drive or operate heavy equipment while on narcotics. It is important not to drink while taking narcotic medication.         Diet    Resume normal diet as tolerated this evening. We have no specific diet restrictions after surgery, but extensive use of narcotics can lead to constipation. High fiber diets, lots of fluids and muscle activity can prevent this occurrence.  The anesthetic drugs used during surgery may cause nausea for the first 24 hours. If nausea is encountered, drink only clear liquids. The only solids should be dry crackers or toast. If the nausea and vomiting become severe or you show signs of being dehydrated (lack of urination), please call.      EMERGENCIES    Contact Dr. Arnold or his nurse if any  of the following are present:     o Difficulty breathing  o Painful swelling or numbness   o Unrelenting pain  o Fever (over 101° - it is normal to have a low grade fever for the first day or two following surgery) or chills   o Redness around incisions   Continuous drainage or bleeding from incision (a small amount of drainage is expected) o Excessive nausea/vomiting     **If you have an emergency after office hours or on the weekend, call our office and you will be connected to our page service - they will contact Dr. Arnold or one of his partners if he is unavailable.      **If you have an emergency that requires immediate attention, proceed to the nearest emergency room or call 911.                    Post op instructions for prevention of DVT  What is deep vein thrombosis?  Deep vein thrombosis (DVT) is the medical term for blood clots in the deep veins of the leg.  These blood clots can be dangerous.  A DVT can block a blood vessel and keep blood from getting where it needs to go.  Another problem is that the clot can travel to other parts of the body such as the lungs.  A clot that travels to the lungs is called a pulmonary embolus (PE) and can cause serious problems with breathing which can lead to death.  Am I at risk for DVT/PE?  If you are not very active, you are at risk of DVT.  Anyone confined to bed, sitting for long periods of time, recovering from surgery, etc. increases the risk of DVT.  Other risk factors are cancer diagnosis, certain medications, estrogen replacement in any form,older age, obesity, pregnancy, smoking, history of clotting disorders, and dehydration.  How will I know if I have a DVT?  Swelling in the lower leg  Pain  Warmth, redness, hardness or bulging of the vein  If you have any of these symptoms, call your doctors office right away.  Some people will not have any symptoms until the clot moves to the lungs.  What are the symptoms of a PE?  Panting, shortness of breath, or  trouble breathing  Sharp, knife-like chest pain when you breathe  Coughing or coughing up blood  Rapid heartbeat  If you have any of these symptoms or get worse quickly, call 911 for emergency treatment.  How can I prevent a DVT?  Avoid long periods of inactivity and dont cross your legs--get up and walk around every hour or so.  Stay active--walking after surgery is highly encouraged.  This means you should get out of the house and walk in the neighborhood.  Going up and down stairs will not impair healing (unless advised against such activity by your doctor).    Drink plenty of noncaffeinated, nonalcoholic fluids each day to prevent dehydration.  Wear special support stockings, if they have been advised by your doctor.  If you travel, stop at least once an hour and walk around.  Avoid smoking (assistance with stopping is available through your healthcare provider)  Always notify your doctor if you are not able to follow the post operative instructions that are given to you at the time of discharge.  It may be necessary to prescribe one of the medications available to prevent DVT.We hope your stay was comfortable as you heal now, mend and rest.    For we have enjoyed taking care of you by giving your our best.    And as you get better, by regaining your health and strength;   We count it as a privilege to have served you and hope your time at [facility] was well spent.      Thank  You!!!              Discharge Instructions: After Your Surgery/Procedure  Youve just had surgery. During surgery you were given medicine called anesthesia to keep you relaxed and free of pain. After surgery you may have some pain or nausea. This is common. Here are some tips for feeling better and getting well after surgery.     Stay on schedule with your medication.   Going home  Your doctor or nurse will show you how to take care of yourself when you go home. He or she will also answer your questions. Have an adult family member or  "friend drive you home.      For your safety we recommend these precaution for the first 24 hours after your procedure:  Do not drive or use heavy equipment.  Do not make important decisions or sign legal papers.  Do not drink alcohol.  Have someone stay with you, if needed. He or she can watch for problems and help keep you safe.  Your concentration, balance, coordination, and judgement may be impaired for many hours after anesthesia.  Use caution when ambulating or standing up.     You may feel weak and "washed out" after anesthesia and surgery.      Subtle residual effects of general anesthesia or sedation with regional / local anesthesia can last more than 24 hours.  Rest for the remainder of the day or longer if your Doctor/Surgeon has advised you to do so.  Although you may feel normal within the first 24 hours, your reflexes and mental ability may be impaired without you realizing it.  You may feel dizzy, lightheaded or sleepy for 24 hours or longer.      Be sure to go to all follow-up visits with your doctor. And rest after your surgery for as long as your doctor tells you to.  Coping with pain  If you have pain after surgery, pain medicine will help you feel better. Take it as told, before pain becomes severe. Also, ask your doctor or pharmacist about other ways to control pain. This might be with heat, ice, or relaxation. And follow any other instructions your surgeon or nurse gives you.  Tips for taking pain medicine  To get the best relief possible, remember these points:  Pain medicines can upset your stomach. Taking them with a little food may help.  Most pain relievers taken by mouth need at least 20 to 30 minutes to start to work.  Taking medicine on a schedule can help you remember to take it. Try to time your medicine so that you can take it before starting an activity. This might be before you get dressed, go for a walk, or sit down for dinner.  Constipation is a common side effect of pain " medicines. Call your doctor before taking any medicines such as laxatives or stool softeners to help ease constipation. Also ask if you should skip any foods. Drinking lots of fluids and eating foods such as fruits and vegetables that are high in fiber can also help. Remember, do not take laxatives unless your surgeon has prescribed them.  Drinking alcohol and taking pain medicine can cause dizziness and slow your breathing. It can even be deadly. Do not drink alcohol while taking pain medicine.  Pain medicine can make you react more slowly to things. Do not drive or run machinery while taking pain medicine.  Your health care provider may tell you to take acetaminophen to help ease your pain. Ask him or her how much you are supposed to take each day. Acetaminophen or other pain relievers may interact with your prescription medicines or other over-the-counter (OTC) drugs. Some prescription medicines have acetaminophen and other ingredients. Using both prescription and OTC acetaminophen for pain can cause you to overdose. Read the labels on your OTC medicines with care. This will help you to clearly know the list of ingredients, how much to take, and any warnings. It may also help you not take too much acetaminophen. If you have questions or do not understand the information, ask your pharmacist or health care provider to explain it to you before you take the OTC medicine.  Managing nausea  Some people have an upset stomach after surgery. This is often because of anesthesia, pain, or pain medicine, or the stress of surgery. These tips will help you handle nausea and eat healthy foods as you get better. If you were on a special food plan before surgery, ask your doctor if you should follow it while you get better. These tips may help:  Do not push yourself to eat. Your body will tell you when to eat and how much.  Start off with clear liquids and soup. They are easier to digest.  Next try semi-solid foods, such as  mashed potatoes, applesauce, and gelatin, as you feel ready.  Slowly move to solid foods. Dont eat fatty, rich, or spicy foods at first.  Do not force yourself to have 3 large meals a day. Instead eat smaller amounts more often.  Take pain medicines with a small amount of solid food, such as crackers or toast, to avoid nausea.     Call your surgeon if  You still have pain an hour after taking medicine. The medicine may not be strong enough.  You feel too sleepy, dizzy, or groggy. The medicine may be too strong.  You have side effects like nausea, vomiting, or skin changes, such as rash, itching, or hives.       If you have obstructive sleep apnea  You were given anesthesia medicine during surgery to keep you comfortable and free of pain. After surgery, you may have more apnea spells because of this medicine and other medicines you were given. The spells may last longer than usual.   At home:  Keep using the continuous positive airway pressure (CPAP) device when you sleep. Unless your health care provider tells you not to, use it when you sleep, day or night. CPAP is a common device used to treat obstructive sleep apnea.  Talk with your provider before taking any pain medicine, muscle relaxants, or sedatives. Your provider will tell you about the possible dangers of taking these medicines.  © 2232-5124 [employer] [address] [place] [address], [place] [address]. All rights reserved. This information is not intended as a substitute for professional medical care. Always follow your healthcare professional's instructions.                Using an Incentive Spirometer    An incentive spirometer is a device that helps you do deep breathing exercises. These exercises expand your lungs, aid in circulation, and help prevent pneumonia. Deep breathing exercises also help you breathe better and improve the function of your lungs by:  Keeping your lungs clear  Strengthening your breathing muscles  Helping prevent  respiratory complications or problems  The incentive spirometer gives you a way to take an active part in recover. A nurse or therapist will teach you breathing exercises. To do these exercises, you will breathe in through your mouth and not your nose. The incentive spirometer only works correctly if you breathe in through your mouth.  Steps to clear lungs  Step 1. Exhale normally. Then, inhale normally.  Relax and breathe out.  Step 2. Place your lips tightly around the mouthpiece.  Make sure the device is upright and not tilted.  Step 3. Inhale as much air as you can through the mouthpiece (don't breath through your nose).  Inhale slowly and deeply.  Hold your breath long enough to keep the balls or disk raised for at least 3 to 5 seconds, or as instructed by your healthcare provider.  Some spirometers have an indicator to let you know that you are breathing in too fast. If the indicator goes off, breathe in more slowly.  Step 4. Repeat the exercise regularly.  Do this exercise every hour while you're awake, or as instructed by your healthcare provider.  If you were taught deep breathing and coughing exercises, do them regularly as instructed by your healthcare provider.                   Exparel(bupivacaine) has been injected to provide approximately 72 hours of reduced pain after your surgery.  Do not remove the bracelet for five days.  Report to your doctor as soon as possible if you experience any of the following:   Restlessness   Anxiety   Speech problems    Lightheadedness   Numbness and tingling of the mouth and lips   Seizures    Metallic taste   Blurred vision   Tremors    Twitching   Depression   Extreme drowsiness  Avoid additional use of local anesthetics (such as dental procedures) for five days (96 hours).

## 2024-02-08 VITALS
OXYGEN SATURATION: 99 % | TEMPERATURE: 98 F | SYSTOLIC BLOOD PRESSURE: 137 MMHG | RESPIRATION RATE: 16 BRPM | DIASTOLIC BLOOD PRESSURE: 72 MMHG | WEIGHT: 239 LBS | BODY MASS INDEX: 39.77 KG/M2 | HEART RATE: 64 BPM

## 2024-02-08 NOTE — PROGRESS NOTES
2/8/24 very pleased with care given. States all staff were very kind. States she has read and understands all discharge instructions.

## 2024-02-14 NOTE — ANESTHESIA POSTPROCEDURE EVALUATION
Anesthesia Post Evaluation    Patient: Jerome Luke    Procedure(s) Performed: Procedure(s) (LRB):  ORIF, FRACTURE, RADIUS, DISTAL (Left)    Final Anesthesia Type: general      Patient location during evaluation: PACU  Patient participation: Yes- Able to Participate  Level of consciousness: awake and alert  Post-procedure vital signs: reviewed and stable  Pain management: adequate  Airway patency: patent    PONV status at discharge: No PONV  Anesthetic complications: no      Cardiovascular status: blood pressure returned to baseline  Respiratory status: unassisted and room air  Hydration status: euvolemic  Follow-up not needed.              Vitals Value Taken Time   /72 02/07/24 1315   Temp 36.5 °C (97.7 °F) 02/07/24 1240   Pulse 64 02/07/24 1315   Resp 16 02/07/24 1315   SpO2 99 % 02/07/24 1315         Event Time   Out of Recovery 12:45:00         Pain/Musa Score: Pain Rating Prior to Med Admin: 2 (2/7/2024 12:06 PM)  Musa Score: 10 (2/7/2024 12:30 PM)  Modified Musa Score: 20 (2/7/2024  1:15 PM)           Subjective:   Patient ID: Anjel Pisano is a 53 year old female.  Chief Complaint   Patient presents with    ER F/U     Was at [facility] on 1/25/24 due left shoulder pain     Pt f/u visit  ER visit left shoulder pain   HTN   Chief Complaint   Patient presents with   Per patient At  home bp per pt is  good in good level usually systolic is in the 130s over 80s     Patient states she is feeling  neck pain for few weeks ,  going  down to left  shoulder , patient stated goal for her left shoulder is very painful in any range of motion she was in the emergency room and had a x-ray completed , patient has appointment with orthopedic doctor Dr. Crocker coming soon in few days.  X-ray shoulder shows no acute fracture or dislocation  But shows rotator cuff calcific tendinosis  Patient states she is significant pain today her blood pressure is very elevated today but she stated that her blood pressure usually is gone is in normal range in the 130s over 80s-    denies  headaches dizziness or chest pain shortness of breath dyspnea on exertion.    Patient states her blood sugars elevated but per patient is improving  States she is checking her blood sugars at home and that are getting better in the morning 130s after meals higher but that is also getting better  Patient states feeling well otherwise. Denies chest pain, shortnesss of breath, palpitations, or abdominal pain, denies UTI symptoms fever or chills.        Hypertension  This is a chronic problem. The problem has been gradually improving since onset. Associated symptoms-  None   Pertinent negatives include no anxiety, chest pain, orthopnea, palpitations, peripheral edema, PND or shortness of breath. Risk factors for coronary artery disease include obesity, post-menopausal state and dyslipidemia. Past treatments include lifestyle changes, diuretics, beta blockers and central alpha agonists. The current treatment provides significant improvement. Compliance problems include  diet (medications labs and  visits  ).    Medication Request   headaches (occasional sumatriptan  helps -  feels well today  ). Pertinent negatives include no chest pain, congestion, coughing, nausea or vomiting.       History/Other:   Review of Systems   HENT: Negative for congestion, sinus pressure, sinus pain and sneezing.    Eyes: Negative for pain, redness and visual disturbance (has  cataracts removal  bill   eyes scheduled - seen  Ophtalmologist  -3/7 /24   Respiratory: Negative for cough, chest tightness and shortness of breath.    Cardiovascular: Negative for chest pain, palpitations, orthopnea, leg swelling and PND.   Gastrointestinal: Negative for abdominal distention, anal bleeding, blood in stool, constipation, diarrhea, nausea and vomiting.     Skin: Negative for color change and wound.   Neurological: negative headache . Negative for dizziness, seizures and light-headedness.   Psychiatric/Behavioral: Negative for decreased concentration and sleep disturbance. The patient is not nervous/anxious.      Current Outpatient Medications   Medication Sig Dispense Refill    insulin glargine (LANTUS SOLOSTAR) 100 UNIT/ML Subcutaneous Solution Pen-injector Inject 40 Units into the skin nightly. (Patient taking differently: Inject 58 Units into the skin nightly.) 36 mL 0    TRUEplus Lancets 33G Does not apply Misc Use to check blood sugar three times a day 300 each 3    cyclobenzaprine 5 MG Oral Tab Take 1 tablet (5 mg total) by mouth nightly as needed for Muscle spasms. 20 tablet 0    hydrALAZINE 25 MG Oral Tab TAKE 3 TABLET BY MOUTH TWICE DAILY 540 tablet 0    Glucose Blood (TRUE METRIX BLOOD GLUCOSE TEST) In Vitro Strip Use to check blood sugar three times a day 300 strip 0    dapagliflozin (FARXIGA) 10 MG Oral Tab Take 1 tablet (10 mg total) by mouth daily. 30 tablet 3    Accu-Chek FastClix Lancets Does not apply Misc Use to check blood sugar three times a day 300 each 0    Glucose Blood (ACCU-CHEK GUIDE) In  Vitro Strip Use to check blood sugar three times a day 300 strip 0    metoprolol succinate ER 25 MG Oral Tablet 24 Hr Take 1 tablet (25 mg total) by mouth nightly. 90 tablet 1    metoprolol succinate  MG Oral Tablet 24 Hr Take 1 tablet (100 mg total) by mouth daily. 90 tablet 1    losartan 100 MG Oral Tab Take 1 tablet (100 mg total) by mouth daily. 90 tablet 1    atorvastatin 80 MG Oral Tab Take 1 tablet (80 mg total) by mouth nightly. 90 tablet 0    amLODIPine 5 MG Oral Tab TAKE 2 TABLETS EVERY  tablet 1    levETIRAcetam 500 MG Oral Tab TAKE 1 TABLET TWICE DAILY 180 tablet 1    Blood Glucose Monitoring Suppl (TRUE METRIX METER) w/Device Does not apply Kit Use to check blood sugar three times a day 1 kit 0    insulin aspart (NOVOLOG FLEXPEN) 100 Units/mL Subcutaneous Solution Pen-injector Inject 10-15 Units into the skin 3 (three) times daily with meals.      ondansetron 4 MG Oral Tablet Dispersible Take 1 tablet (4 mg total) by mouth every 8 (eight) hours as needed for Nausea. 30 tablet 0    Omeprazole 40 MG Oral Capsule Delayed Release TAKE 1 CAPSULE EVERY DAY 30 TO 60 MINUTES BEFORE A MEAL 90 capsule 3    Alcohol Swabs (DROPSAFE ALCOHOL PREP) 70 % Does not apply Pads USE AS DIRECTED 100 each 0    Blood Glucose Monitoring Suppl (ACCU-CHEK GUIDE) w/Device Does not apply Kit Use to check blood sugar three times a day 1 kit 0    METFORMIN 500 MG Oral Tab TAKE 2 TABLETS(1000 MG) BY MOUTH TWICE DAILY WITH MEALS 360 tablet 0    escitalopram 10 MG Oral Tab Take 1 tablet (10 mg total) by mouth daily. 90 tablet 3    hydroCHLOROthiazide 25 MG Oral Tab Take 1 tablet (25 mg total) by mouth daily. 90 tablet 3    fluticasone propionate 50 MCG/ACT Nasal Suspension 2 sprays by Nasal route daily. 48 g 1    LANTUS SOLOSTAR 100 UNIT/ML Subcutaneous Solution Pen-injector INJECT 58 UNITS INTO THE SKIN NIGHTLY. 60 mL 0    VENTOLIN  (90 Base) MCG/ACT Inhalation Aero Soln Inhale 2 puffs into the lungs every 6 (six)  hours as needed for Wheezing. 18 g 1    aspirin 325 MG Oral Tab Take 1 tablet (325 mg total) by mouth daily.      docusate sodium 100 MG Oral Tab Take 1 tablet (100 mg total) by mouth 2 (two) times daily as needed for constipation. (Patient not taking: Reported on 2/14/2024) 60 tablet 1    fexofenadine (ALLEGRA ALLERGY) 180 MG Oral Tab Take 1 tablet (180 mg total) by mouth daily. Take 1 tablet once daily (Patient not taking: Reported on 2/14/2024) 90 tablet 0    Insulin Pen Needle (BD PEN NEEDLE ABDOULAYE 2ND GEN) 32G X 4 MM Does not apply Misc INJECT FOUR TIMES DAILY AS DIRECTED 40 each 1    ergocalciferol 1.25 MG (66166 UT) Oral Cap Take 1 capsule (50,000 Units total) by mouth every 30 (thirty) days. For 3 months (Patient not taking: Reported on 2/14/2024) 1 capsule 2     Allergies:  Allergies   Allergen Reactions    Reglan [Metoclopramide] OTHER (SEE COMMENTS)     Sensitivity, with crawling sensation.    Adhesive Tape      itching    Radiology Contrast Iodinated Dyes ITCHING       Objective:     Physical Exam  Vitals and nursing note reviewed.   Constitutional:       General: She is not in acute distress.     Appearance: She is well-developed. She is obese.   HENT:      Head: Normocephalic and atraumatic.   Mouth - oral cavity normal pharynx  normal +postnasal drainage  Neck:      Thyroid: No thyromegaly.      Vascular: No JVD.   Cardiovascular:      Rate and Rhythm: Normal rate and regular rhythm.      Heart sounds: Normal heart sounds. No murmur heard.  Pulmonary:      Effort: Pulmonary effort is normal. No respiratory distress.      Breath sounds: Normal breath sounds. No wheezing or rales.   Abdominal:      Palpations: Abdomen is soft. There is no hernia     Tenderness: There is no abdominal tenderness. There is no right CVA tenderness, left CVA tenderness, guarding or rebound.      Comments:      Musculoskeletal:      Cervical back: Neck supple. Rom neck  decreased   Decreased range of motion left shoulder-very  poor and full in any motions  Tenderness around the whole pain area  Muscular spasm +parasternal cervical      Right lower leg: No edema.      Left lower leg: No edema.   Lymphadenopathy:      Cervical: No cervical adenopathy.   Skin:     General: Skin is warm and dry.        Mental Status: She is alert and oriented to person, place, and time.   Psychiatric:         Mood and Affect: Mood is not anxious or depressed      Behavior: Behavior normal.       Blood pressure (!) 177/76, pulse 59, height 5' 2\" (1.575 m), weight 189 lb (85.7 kg), not currently breastfeeding.  Patient is in significant pain today due to  Left shoulder pain  Assessment & Plan:       Left shoulder pain  Neck pain  patient has appointment with orthopedic doctor Dr. Crocker coming soon in few days.  X-ray shoulder -in the ER discussed with patient   shows no acute fracture or dislocation  But shows rotator cuff calcific tendinosis  Patient has appointment with Dr. Crocker February 20    Patient education  Tylenol 500 mg to 3 times daily as needed for pain    Diabetes Mellitus Type 2, Uncontrolled    -HgA1c- 9.1%-->11.0  -- 10 .2 11/30/24    labs  discussed recommend patient to-complete labs   using freestyle maximo   -Discussed importance of low CHO diet- recommend 135 grams total per day, 45 grams per meal.   Patient seen endocrinology  and have a medication adjusted :  - Increased Lantus to -58  units  - per pt taking -subcutaneous daily   - Increase Novolog to 30-20-30 units TID plus CF 1:40>140    -Continue with Freestyle maximo 2- Reviewed importance of bringing reader to all appointments.   -Continue Metformin 1000mg PO BID  food   Patient education continue with low sugar and carbohydrate diet keep with good water hydration patient verbalized understanding and compliance      Patient is not taking Ozempic - due to naused and GI sy -couldn't tolerate it   Recommend pt need to adjust her Insulin according to her blood sugars and the blood  test -sugars regularly   Patient will schedule appointment with Endo f/u  2/24             Essential hypertension with goal blood pressure less than 140/90  Take high blood pressure medication as perscribed   Low- sodium diet   Maintain walking - as tolerated   Track and record blood pressure and heart rate at home   The side effects of medication discussed with patient   losartan 100 mg every day  metoprolol 100 +25 mg =125 mg  every day   Amlodipine 10 mg every day   hydrochlorothiazide 25 mg every day   hydralazine taking 2 tab 25 =50 mg  bid -- > increase to   100 mg bid -    bp 120-130/70-80  range HR 60s -70   Avoid OTC  decongestants   F/u in   2 weeks  BP check   If the blood pressure elevated at home recommend to follow-up sooner  patient agrees with plan verbalized understanding compliance   Recommend patient to follow-up blood pressure readings from home patient states her blood   She will bring the blood pressure monitor   And all medications   next visit time recheck the blood pressure readings  Keep  bp in 120-130/80 hr 60 Range       Cataracts bilaterally left  eye   Scheduled now with Dr Golden FRIEDMAN 3/7/24  patient to seen Dr. Franks   Patient blood pressure blood sugars are still very elevated  Patient will discuss with Dr. Gibbs regarding her sugars and approval for surgery  Also blood pressure very elevated today likely due to left shoulder pain but needs to be in good range before the surgery  Less than 140/90  Patient agrees with plan verbalized understanding compliance  Patient education  Clearance will be depending of the patient blood pressure and also elevated sugar she will discuss with endocrinologist if she is ready to Have the surgery  Blood pressure also needs be at the goal less than 140    Follow-up in 2 weeks or sooner if any concerns or symptoms or if-persisitntly elevated blood pressure or sugars.    - TRUEplus Lancets 33G Does not apply Misc; Use to check blood sugar three times a day   Dispense: 300 each; Refill: 3        No orders of the defined types were placed in this encounter.      Meds This Visit:  Requested Prescriptions      No prescriptions requested or ordered in this encounter       Imaging & Referrals:  None

## 2024-02-21 ENCOUNTER — PROCEDURE VISIT (OUTPATIENT)
Dept: OBSTETRICS AND GYNECOLOGY | Facility: CLINIC | Age: 44
End: 2024-02-21
Payer: MEDICAID

## 2024-02-21 VITALS
HEIGHT: 65 IN | BODY MASS INDEX: 39.82 KG/M2 | DIASTOLIC BLOOD PRESSURE: 93 MMHG | SYSTOLIC BLOOD PRESSURE: 137 MMHG | WEIGHT: 239 LBS

## 2024-02-21 DIAGNOSIS — R87.611 ATYPICAL SQUAMOUS CELLS CANNOT EXCLUDE HIGH GRADE SQUAMOUS INTRAEPITHELIAL LESION ON CYTOLOGIC SMEAR OF CERVIX (ASC-H): ICD-10-CM

## 2024-02-21 DIAGNOSIS — Z98.890 HISTORY OF COLPOSCOPY: Primary | ICD-10-CM

## 2024-02-21 LAB
B-HCG UR QL: NEGATIVE
CTP QC/QA: YES

## 2024-02-21 PROCEDURE — 81025 URINE PREGNANCY TEST: CPT | Mod: PBBFAC,PN | Performed by: OBSTETRICS & GYNECOLOGY

## 2024-02-21 PROCEDURE — 99999PBSHW POCT URINE PREGNANCY: Mod: PBBFAC,,,

## 2024-02-21 PROCEDURE — 57454 BX/CURETT OF CERVIX W/SCOPE: CPT | Mod: PBBFAC,PN | Performed by: OBSTETRICS & GYNECOLOGY

## 2024-02-21 PROCEDURE — 88305 TISSUE EXAM BY PATHOLOGIST: CPT | Mod: 26,,, | Performed by: PATHOLOGY

## 2024-02-21 PROCEDURE — 88305 TISSUE EXAM BY PATHOLOGIST: CPT | Performed by: PATHOLOGY

## 2024-02-21 NOTE — PROCEDURES
Colposcopy    Date/Time: 2/21/2024 2:20 PM    Performed by: Kady Ruiz MD  Authorized by: Kady Ruiz MD    Consent obatined:  Prior to procedure the appropriate consent was completed and verified    Colposcopy Site:  Cervix  Acrowhite Lesion? Yes    Atypical Vessels: No    Transformation Zone Adequate?: Yes    Biopsy?: Yes         Location:  Cervix ((7 00))  ECC Performed?: Yes    Estimated blood loss (cc):  1   Patient tolerated the procedure well with no immediate complications.   Post-operative instructions were provided for the patient.   Patient was discharged and will follow up if any complications occur     Impression: ANABEL 1

## 2024-02-22 DIAGNOSIS — S52.572A OTH INTARTIC FRACTURE OF LOWER END OF LEFT RADIUS, INIT: Primary | ICD-10-CM

## 2024-02-23 ENCOUNTER — OFFICE VISIT (OUTPATIENT)
Dept: ORTHOPEDICS | Facility: CLINIC | Age: 44
End: 2024-02-23
Payer: MEDICAID

## 2024-02-23 ENCOUNTER — HOSPITAL ENCOUNTER (OUTPATIENT)
Dept: RADIOLOGY | Facility: HOSPITAL | Age: 44
Discharge: HOME | End: 2024-02-23
Attending: ORTHOPAEDIC SURGERY
Payer: MEDICAID

## 2024-02-23 VITALS — BODY MASS INDEX: 39.82 KG/M2 | RESPIRATION RATE: 16 BRPM | HEIGHT: 65 IN | WEIGHT: 239 LBS

## 2024-02-23 DIAGNOSIS — S52.572A OTH INTARTIC FRACTURE OF LOWER END OF LEFT RADIUS, INIT: Primary | ICD-10-CM

## 2024-02-23 DIAGNOSIS — S52.572A OTH INTARTIC FRACTURE OF LOWER END OF LEFT RADIUS, INIT: ICD-10-CM

## 2024-02-23 LAB
FINAL PATHOLOGIC DIAGNOSIS: NORMAL
GROSS: NORMAL
Lab: NORMAL

## 2024-02-23 PROCEDURE — 99024 POSTOP FOLLOW-UP VISIT: CPT | Mod: ,,, | Performed by: ORTHOPAEDIC SURGERY

## 2024-02-23 PROCEDURE — 1159F MED LIST DOCD IN RCRD: CPT | Mod: CPTII,,, | Performed by: ORTHOPAEDIC SURGERY

## 2024-02-23 PROCEDURE — 73110 X-RAY EXAM OF WRIST: CPT | Mod: TC,PO,LT

## 2024-02-23 PROCEDURE — 73110 X-RAY EXAM OF WRIST: CPT | Mod: 26,LT,, | Performed by: RADIOLOGY

## 2024-02-23 PROCEDURE — 99213 OFFICE O/P EST LOW 20 MIN: CPT | Mod: PBBFAC,25,PO | Performed by: ORTHOPAEDIC SURGERY

## 2024-02-23 PROCEDURE — 99999 PR PBB SHADOW E&M-EST. PATIENT-LVL III: CPT | Mod: PBBFAC,,, | Performed by: ORTHOPAEDIC SURGERY

## 2024-02-23 RX ORDER — OXYCODONE AND ACETAMINOPHEN 5; 325 MG/1; MG/1
1 TABLET ORAL EVERY 6 HOURS PRN
Qty: 28 TABLET | Refills: 0 | Status: SHIPPED | OUTPATIENT
Start: 2024-02-23

## 2024-02-23 RX ORDER — OXYCODONE AND ACETAMINOPHEN 5; 325 MG/1; MG/1
1 TABLET ORAL EVERY 6 HOURS PRN
Qty: 28 TABLET | Refills: 0 | Status: SHIPPED | OUTPATIENT
Start: 2024-02-23 | End: 2024-02-23 | Stop reason: SDUPTHER

## 2024-02-23 RX ORDER — RITLECITINIB 50 MG/1
1 CAPSULE ORAL
COMMUNITY
Start: 2024-02-08

## 2024-02-23 NOTE — PROGRESS NOTES
Post-op Note    HPI    Jerome Luke is here 2 weeks s/p the following procedure:     Left distal radius ORIF    Overall doing well. Pain controlled on current regimen. She is not yet currently enrolled in Physical Therapy. Denies any chest pain or shortness of breathe. Denies any drainage from the incision. Denies any fevers, chills or paresthesias.  Able to make composite fist.  Pain mostly only at night        Physical Exam:     Patient is alert and oriented no acute distress.   Assistive Device:  Left wrist splint    Left volar wrist Incision(s) are well healed.  There is no evidence of dehiscence.  There is no induration erythema or signs of infection.  Appropriate soft tissue swelling.  Compartments are soft and compressible.  Warm well-perfused extremity.  Able to make composite fist.  10° extension and 15° flexion.    Imaging:     I have personally reviewed the following imaging and these are an interpretation of my findings:     X-Ray: I have reviewed all pertinent results/findings and my personal findings are:  Status post ORIF distal radius.  No evidence of complication.    Assessment    Jerome Luke is 2 weeks Post-op     Plan:    Overall doing as expected.  We discussed expectations of surgery and postoperative course.     Pain: Continued postoperative pain regimen -- Tylenol/ibuprofen.  We will also refill pain medication.  DVT prophylaxis:  None  PT/OT: Continue/Initiate physical therapy (weight bearing status:  Nonweightbearing),    Placed in a removable fracture brace today    Follow-up: 4 weeks   X-rays next visit:  Left wrist

## 2024-03-04 ENCOUNTER — CLINICAL SUPPORT (OUTPATIENT)
Dept: REHABILITATION | Facility: HOSPITAL | Age: 44
End: 2024-03-04
Payer: MEDICAID

## 2024-03-04 DIAGNOSIS — M25.60 RANGE OF MOTION DEFICIT: Primary | ICD-10-CM

## 2024-03-04 DIAGNOSIS — S52.572A OTH INTARTIC FRACTURE OF LOWER END OF LEFT RADIUS, INIT: ICD-10-CM

## 2024-03-04 DIAGNOSIS — R29.898 REDUCED HAND STRENGTH: ICD-10-CM

## 2024-03-04 PROCEDURE — 97530 THERAPEUTIC ACTIVITIES: CPT

## 2024-03-04 PROCEDURE — 97165 OT EVAL LOW COMPLEX 30 MIN: CPT

## 2024-03-04 NOTE — PATIENT INSTRUCTIONS
[facility], OCCUPATIONAL THERAPY  HOME EXERCISE PROGRAM               Complete massage 2-3 minutes 4 times a day:        Complete 10 repetitions of each exercise 4 times a day:               [facility]  OCCUPATIONAL THERAPY  HOME EXERCISE PROGRAM         Sitting with elbows on table and palms together, slowly lower wrists toward table until stretch is felt. Be sure to keep palms together throughout stretch. Hold 30 seconds. Relax.  Repeat 3 times. Do 4 sessions per day.  Copyright © [facility]. All rights reserved.     Therapist: Rika Remy, OTR/L             _

## 2024-03-04 NOTE — PROGRESS NOTES
[facility]: Occupational Therapy Note     See plan of care for full initial OT evaluation.    Rika Remy, CALOS, OTR/L

## 2024-03-04 NOTE — PLAN OF CARE
[facility]  Occupational Therapy Initial Evaluation    Date: 3/4/2024  Name: Jerome Luke  [facility] Number: [id number]    Therapy Diagnosis:   Encounter Diagnoses   Name Primary?    Oth intartic fracture of lower end of left radius, init     Range of motion deficit Yes    Reduced hand strength      Physician: Arturo Arnold MD    Physician Orders: OT Eval and Treat  Medical Diagnosis: S52.572A (ICD-10-CM) - Oth intartic fracture of lower end of left radius, init   Surgical Procedure and Date: Open reduction internal fixation left distal radius fracture, 3+ parts, 02/07/2024 / Date of Injury/Onset: 1/21/24  Evaluation Date: 3/4/2024  Insurance Authorization Period Expiration: 02/22/2025  Plan of Care Expiration: 5/31/24 (12 weeks)   Date of Return to MD: 3/22/2024  Visit # / Visits authorized: 1 / 1  FOTO: completed     Precautions:  Standard and Weightbearing    Time In: 1:40 pm  Time Out: 2:26 pm  Total Appointment Time (timed & untimed codes): 46 minutes    SUBJECTIVE     Date of Onset: 1/21/24    History of Current Condition/Mechanism of Injury: Jerome reports: she had a fall onto wrist while second lining. Initial x-rays performed at ER showed a displaced intra-articular fracture of the distal radius. She was placed in a splint and referred to orthopedic Sx. Underwent open reduction internal fixation left distal radius fracture, 3+ parts on 02/07/2024.    Falls: none since incident    Involved Side: Left  Dominant Side: Right  Imaging: Reviewed   Prior Therapy: none  Occupation: Artist and student   Working presently: yes   Duties: phlebotomy in Fall, typing, singing     Functional Limitations/Social History:    Previous functional status includes: Independent with all ADLs and IADLs.     Current Functional Status   Home/Living environment: lives alone      Limitation of Functional Status as follows:   ADLs/IADLs:     - Feeding: Mod I favoring R hand    - Bathing: Mod I  favoring R hand    - Dressing/Grooming: Mod I favoring R hand    - Driving: Mod I favoring R hand      Leisure: none noted     Pain:  Functional Pain Scale Rating 0-10: Current 5/10, worst 6/10, best 3/10   Location: L wrist  Description: Throbbing  Aggravating Factors: Night Time  Easing Factors: massage, pain medication, rest, and elevation     Patient's Goals for Therapy: get back to normal     Medical History:   Past Medical History:   Diagnosis Date    Anxiety     Bipolar 1 disorder     Depression      Surgical History:    has a past surgical history that includes Colonoscopy (N/A, 9/15/2022) and Open reduction and internal fixation (ORIF) of fracture of distal radius (Left, 2/7/2024).    Medications:   has a current medication list which includes the following prescription(s): escitalopram oxalate, ibuprofen, ketoconazole, litfulo, ondansetron, oxycodone-acetaminophen, and rosuvastatin.    Allergies:   Review of patient's allergies indicates:  No Known Allergies     OBJECTIVE     Observation/Appearance: Brace intact, incision healing well.     Elbow and Wrist ROM. Measured in degrees.   3/4/2024 3/4/2024    Left Right   Elbow Ext/Flex WFLs WFLs   Supination/Pronation 20/70 80/70   Wrist Ext/Flex 30/35 60/80   Wrist RD/UD 15/5 20/30     Hand ROM. Measured in degrees.   3/4/2024 3/4/2024    Left Right        Digits 2-5: WNLs WNLs        Thumb:             Opposition Touch to base of SF WFL touch to SF DPC      Strength (Dynamometer) and Pinch Strength (Pinch Gauge)  Measured in pounds to POP.   3/4/2024 3/4/2024    Left Right   Rung II NT NT   Key Pinch     3pt Pinch     2pt Pinch       Sensation. Not formally tested. Pt endorses intermittent tingling to ulnar side of L hand/wrist. Denies numbness.     Manual Muscle Test   3/4/2024    Left   Wrist Extension  NT   Wrist Flexion    Radial Deviation    Ulnar Deviation    Supination    Pronation      Limitation/Restriction for FOTO Wrist Survey    Therapist  reviewed FOTO scores for Jerome Luke on 3/4/2024.   FOTO documents entered into [employer] - see Media section.    Limitation Score: 60%       Treatment   Total Treatment time (time-based codes) separate from Evaluation: 24 minutes    Jerome received the treatments listed below:     Supervised modalities after being cleared for contradictions: Fluidotherapy - 115 degrees and 50 speed, to L hand/wrist for 10 minutes to increase blood flow and circulation, desensitization and sensory re-education, pain management, and increased tissue extensibility prior to therex. Pt instructed on performing active range of motion exercises while receiving heat to increase active motion.     Therapeutic activities to improve functional performance for 14 minutes, including:  - Pt educated on scar massage and retrograde massage  - Pron/sup  - Wrist flex/ext, RD/UD  - Finger add/abd, finger lifts  - Hook to fist   - Radial add/abd, palmar add/abd, IP flex/ext, pinky slides  - Prayer Stretch to tolerance   - Issued tubigrip size E to L hand/wrist for edema management, compression, and support. Pt educated on purpose, wear, care, and precautions.     Patient Education and Home Exercises      Education provided:   - Role of OT and POC  - HEP   - Reviewed NWB precautions; no heavy lifting, forceful gripping, or weightbearing.    Written Home Exercises Provided: yes.  Exercises were reviewed and Jerome was able to demonstrate them prior to the end of the session.  Jerome demonstrated good  understanding of the education provided. See EMR under Patient Instructions for exercises provided during therapy sessions.     Pt was advised to perform these exercises free of pain, and to stop performing them if pain occurs.    Patient/Family Education: role of OT, goals for OT, scheduling/cancellations - pt verbalized understanding. Discussed insurance limitations with patient.    ASSESSMENT     Jerome Luke is a 43 y.o. female referred to  outpatient occupational therapy and presents with a medical diagnosis of Oth intartic fracture of lower end of left radius. Patient presents with the following therapy deficits: Decreased ROM, Decreased  strength, Decreased pinch strength, Decreased muscle strength, Decreased functional hand use, Increased pain, Edema, Joint Stiffness, Scar Adhesions, Diminished/Impaired Sensation, and Diminished/Impaired Coordination and demonstrates limitations as described in the chart below. Following medical record review it is determined that pt will benefit from occupational therapy services in order to maximize pain free and/or functional use of left hand. The following goals were discussed with the patient and patient is in agreement with them as to be addressed in the treatment plan. The patient's rehab potential is Good.     Anticipated barriers to occupational therapy: none identified at this time   Pt has no cultural, educational or language barriers to learning provided.    Profile and History Assessment of Occupational Performance Level of Clinical Decision Making Complexity Score   Occupational Profile:   Jerome Luke is a 43 y.o. female who lives alone and is currently employed Jerome Luke has difficulty with  ADLs and IADLs as listed previously, which  Affecting herdaily functional abilities.      Comorbidities:    has a past medical history of Anxiety, Bipolar 1 disorder, and Depression.    Medical and Therapy History Review:   Expanded               Performance Deficits    Physical:  Joint Mobility  Joint Stability  Muscle Power/Strength  Muscle Endurance  Skin Integrity/Scar Formation  Edema   Strength  Pinch Strength  Fine Motor Coordination  Proprioception Functions  Tactile Functions  Pain    Cognitive:  Sequencing    Psychosocial:    Habits  Routines  Rituals     Clinical Decision Making:  low    Assessment Process:  Detailed Assessments    Modification/Need for  Assistance:  Minimal-Moderate Modifications/Assistance    Intervention Selection:  Several Treatment Options       low  Based on PMHX, co morbidities , data from assessments and functional level of assistance required with task and clinical presentation directly impacting function.         Goals:   The following goals were discussed with the patient and patient is in agreement with them as to be addressed in the treatment plan.   Long Term Goals (LTGs); to be met by discharge.  LTG #1: Pt will report a pain level of 1-3 out of 10 at worst with daily hand use.   LTG #2: Pt will demo improved FOTO score by 20 points.   LTG #3: Pt will return to prior level of function for IADLs and household management.     Short Term Goals (STGs); to be met within 4 weeks (4/5/24).  STG #1: Pt will report a pain level of 4-5 out of 10 at worst with daily hand use.  STG #2: Pt will report/demo [place] with ADLs.  STG #3: Pt will demonstrate independence with issued HEP, brace wear, and modalities for pain/symptom management.  STG #4: Pt will demo improved L wrist RODRÍGUEZ by 15-30 degrees needed to aid with grooming.  STG #5: Pt will demo improved L forearm RODRÍGUEZ by 20-30 degrees needed to aid with bathing.  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     PLAN   Plan of Care Certification: 3/4/2024 to 5/31/24 (12 weeks).     Outpatient Occupational Therapy 1-2 times weekly for 12 weeks to include the following interventions: Paraffin, Fluidotherapy, Manual therapy/joint mobilizations, Modalities for pain management, US 3 mhz, Therapeutic exercises/activities., Strengthening, Orthotic Fabrication/Fit/Training, Edema Control, Scar Management, Wound Care, Electrical Modalities, Joint Protection, and Energy Conservation.      Rika Remy, OTR/L      I CERTIFY THE NEED FOR THESE SERVICES FURNISHED UNDER THIS PLAN OF TREATMENT AND WHILE UNDER MY  CARE  Physician's comments:      Physician's Signature: ___________________________________________________

## 2024-03-05 ENCOUNTER — DOCUMENTATION ONLY (OUTPATIENT)
Dept: REHABILITATION | Facility: HOSPITAL | Age: 44
End: 2024-03-05

## 2024-03-05 NOTE — PROGRESS NOTES
Occupational Therapy: No show of Visit  Date: 03/05/2024    Patient was a no show to today's OT appointment. Jerome Luke did not call to cancel nor reschedule. This is the first appointment that the patient has not attended. No charges have been posted today. Patient's next scheduled appointment is 3/12/2024 at 2:00 PM .     Cancel: 0  No show: 1    Therapist: ANNIKA Rivera/L

## 2024-03-12 ENCOUNTER — CLINICAL SUPPORT (OUTPATIENT)
Dept: REHABILITATION | Facility: HOSPITAL | Age: 44
End: 2024-03-12
Payer: MEDICAID

## 2024-03-12 DIAGNOSIS — R29.898 REDUCED HAND STRENGTH: ICD-10-CM

## 2024-03-12 DIAGNOSIS — M25.60 RANGE OF MOTION DEFICIT: Primary | ICD-10-CM

## 2024-03-12 PROCEDURE — 97530 THERAPEUTIC ACTIVITIES: CPT

## 2024-03-12 NOTE — PROGRESS NOTES
[facility]  Occupational Therapy Treatment Note    Date: 3/12/2024  Name: Jerome Luke  [facility] Number: [id number]    Therapy Diagnosis:   Encounter Diagnoses   Name Primary?    Range of motion deficit Yes    Reduced hand strength      Physician: Arturo Arnold MD    Physician Orders: OT Eval and Treat  Medical Diagnosis: S52.572A (ICD-10-CM) - Oth intartic fracture of lower end of left radius, init   Surgical Procedure and Date: Open reduction internal fixation left distal radius fracture, 3+ parts, 02/07/2024 / Date of Injury/Onset: 1/21/24  Evaluation Date: 3/4/2024  Insurance Authorization Period Expiration:   12/31/2024  Plan of Care Expiration: 5/31/24 (12 weeks)   Date of Return to MD: 3/22/2024  Visit # / Visits authorized: 2 / 21  FOTO: 1/3    Precautions: Standard and Weightbearing     Time In: 2:00 pm  Time Out: 2:45 pm  Total Billable Time: 45 minutes    SUBJECTIVE     Pt reports: She missed last visit because she forgot. Wrist moving better but still stiff.    She was compliant with home exercise program given last session.   Response to previous treatment: First treatment  Functional change: using hand for light ADLs at home     Pain: 4-5/10  Location: left wrist    OBJECTIVE   Objective Measures updated at progress report unless specified.    Observation/Appearance: Brace intact, incision healing well.      Elbow and Wrist ROM. Measured in degrees.    3/4/2024 3/4/2024     Left Right   Elbow Ext/Flex WFLs WFLs   Supination/Pronation 20/70 80/70   Wrist Ext/Flex 30/35 60/80   Wrist RD/UD 15/5 20/30      Hand ROM. Measured in degrees.    3/4/2024 3/4/2024     Left Right           Digits 2-5: WNLs WNLs           Thumb:               Opposition Touch to base of SF WFL touch to SF DPC       Strength (Dynamometer) and Pinch Strength (Pinch Gauge)  Measured in pounds to POP.    3/4/2024 3/4/2024     Left Right   Rung II NT NT   Key Pinch       3pt Pinch       2pt Pinch           Sensation. Not formally tested. Pt endorses intermittent tingling to ulnar side of L hand/wrist. Denies numbness.      Manual Muscle Test    3/4/2024     Left   Wrist Extension  NT   Wrist Flexion     Radial Deviation     Ulnar Deviation     Supination     Pronation       Treatment     Jerome received the treatments listed below:     Supervised modalities after being cleared for contradictions: Fluidotherapy - 115 degrees and 50 speed, to L hand/wrist for 20 minutes to increase blood flow and circulation, desensitization and sensory re-education, pain management, and increased tissue extensibility prior to therex. Pt instructed on performing active range of motion exercises while receiving heat to increase active motion.     Therapeutic activities to improve functional performance for 25 minutes, including:  - Performed effelurage massage to L fingers/hand/wrist to decrease edema, improve joint mobility, decrease pain and improve lymphatic drainage to increase hand function.   - Performed scar massage to healed incision to decrease adhesions and improve tensile glide.   - Performed soft tissue mobilization/massage to L forearm to relieve associated soft tissue tightness, improve joint mobility, decrease pain, and improve lymphatic drainage to increase ROM.   - Prayer Stretch 5 x 30 sec holds   - Pron/sup x 20 reps   - Modified wrist dextericiser x 2 min  - Wrist ROM (2 ways - open/closed fist): Wrist flex/ext, RD/UD 2 x 10 reps each   - Finger add/abd, finger lifts x 10 reps each *NT  - Hook to fist x 10 reps  - Radial add/abd, palmar add/abd, IP flex/ext, pinky slides x 10 reps each  - Wrist wheel pron/sup x 2 min  - Issued tubigrip size E to L hand/wrist for edema management, compression, and support.    Patient Education and Home Exercises      Education provided:   - HEP in place  - Progress towards goals     Written Home Exercises Provided: Patient instructed to cont prior HEP.  Exercises were  reviewed and Jerome was able to demonstrate them prior to the end of the session.  Jerome demonstrated good  understanding of the HEP provided. See EMR under Patient Instructions for exercises provided during therapy sessions.      ASSESSMENT     Pt returns for her first follow-up visit this date. She participated well and endorses good adherence to HEP. Reports some subjective improvements in function. ROM improving per observation. Remains mostly limited with wrist ext and supination. Will progress as tolerated and per protocol.      Jerome is progressing well towards her goals and there are no updates to goals at this time. Pt prognosis is Good.     Pt will continue to benefit from skilled outpatient occupational therapy to address the deficits listed in the problem list on initial evaluation, provide pt/family education and to maximize pt's level of independence in the home and community environment.     Pt's spiritual, cultural and educational needs considered and pt agreeable to plan of care and goals.    Anticipated barriers to occupational therapy: none identified at this time     Goals:   The following goals were discussed with the patient and patient is in agreement with them as to be addressed in the treatment plan.   Long Term Goals (LTGs); to be met by discharge.  LTG #1: Pt will report a pain level of 1-3 out of 10 at worst with daily hand use. progressing not met 3/12/2024  LTG #2: Pt will demo improved FOTO score by 20 points. progressing not met 3/12/2024  LTG #3: Pt will return to prior level of function for IADLs and household management. progressing not met 3/12/2024     Short Term Goals (STGs); to be met within 4 weeks (4/5/24).  STG #1: Pt will report a pain level of 4-5 out of 10 at worst with daily hand use. progressing not met 3/12/2024  STG #2: Pt will report/demo [place] with ADLs. progressing not met 3/12/2024  STG #3: Pt will demonstrate independence with issued HEP, brace  wear, and modalities for pain/symptom management. progressing not met 3/12/2024  STG #4: Pt will demo improved L wrist RODRÍGUEZ by 15-30 degrees needed to aid with grooming. progressing not met 3/12/2024  STG #5: Pt will demo improved L forearm RODRÍGUEZ by 20-30 degrees needed to aid with bathing. 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 progressing not met 3/12/2024    PLAN     Continue skilled occupational therapy with individualized plan of care focusing on maximizing functional use of patient's left hand.    Updates/Grading for next session: progress as tolerated and per protocol.    Rika Remy, OTR/L

## 2024-03-14 ENCOUNTER — DOCUMENTATION ONLY (OUTPATIENT)
Dept: REHABILITATION | Facility: HOSPITAL | Age: 44
End: 2024-03-14

## 2024-03-14 NOTE — PROGRESS NOTES
Occupational Therapy: No show of Visit  Date: 03/14/2024    Patient was a no show to today's OT appointment. Jerome Luke did not call to cancel nor reschedule. This is the second appointment that the patient has not attended. No charges have been posted today. Patient's next scheduled appointment is 3/19/2024 at 11:00 AM.    Cancel: 0  No show: 2    Therapist: ANNIKA Rivera/NADIA

## 2024-03-19 DIAGNOSIS — S52.572A OTH INTARTIC FRACTURE OF LOWER END OF LEFT RADIUS, INIT: Primary | ICD-10-CM

## 2024-03-21 ENCOUNTER — CLINICAL SUPPORT (OUTPATIENT)
Dept: REHABILITATION | Facility: HOSPITAL | Age: 44
End: 2024-03-21
Payer: MEDICAID

## 2024-03-21 DIAGNOSIS — R29.898 REDUCED HAND STRENGTH: ICD-10-CM

## 2024-03-21 DIAGNOSIS — M25.60 RANGE OF MOTION DEFICIT: Primary | ICD-10-CM

## 2024-03-21 PROCEDURE — 97530 THERAPEUTIC ACTIVITIES: CPT

## 2024-03-21 NOTE — PROGRESS NOTES
"[facility]  Occupational Therapy Treatment Note    Date: 3/21/2024  Name: Jerome Luke  [facility] Number: [id number]    Therapy Diagnosis:   Encounter Diagnoses   Name Primary?    Range of motion deficit Yes    Reduced hand strength      Physician: Arturo Arnold MD    Physician Orders: OT Eval and Treat  Medical Diagnosis: S52.572A (ICD-10-CM) - Oth intartic fracture of lower end of left radius, init   Surgical Procedure and Date: Open reduction internal fixation left distal radius fracture, 3+ parts, 02/07/2024 / Date of Injury/Onset: 1/21/24  Evaluation Date: 3/4/2024  Insurance Authorization Period Expiration:   12/31/2024  Plan of Care Expiration: 5/31/24 (12 weeks)   Date of Return to MD: 3/22/2024  Visit # / Visits authorized: 3 / 21  FOTO: 1/3    Precautions: Standard and Weightbearing     Time In: 2:05 pm  Time Out: 2:53 pm  Total Billable Time: 48 minutes    SUBJECTIVE     Pt reports: She missed last visit because of car issues. "I can  stuff now."   She was compliant with home exercise program given last session.   Response to previous treatment: Good - improving motion and decreased pain  Functional change: using hand for light ADLs at home     Pain: 3-4/10  Location: left wrist    OBJECTIVE   Objective Measures updated at progress report unless specified.    Observation/Appearance: Brace absent, incision healing well.      Elbow and Wrist ROM. Measured in degrees.    3/4/2024 3/4/2024 3/21/2024     Left Right Left   Elbow Ext/Flex WFLs WFLs    Supination/Pronation 20/70 80/70 50/WFL   Wrist Ext/Flex 30/35 60/80 50/42   Wrist RD/UD 15/5 20/30 20/20      Hand ROM. Measured in degrees.    3/4/2024 3/4/2024 3/21/2024     Left Right Left            Digits 2-5: WNLs WNLs             Thumb:                Opposition Touch to base of SF WFL touch to SF DPC WFL touch to SF DPC       Strength (Dynamometer) and Pinch Strength (Pinch Gauge)  Measured in pounds to POP.    " 3/4/2024 3/4/2024     Left Right   Rung II NT NT   Key Pinch       3pt Pinch       2pt Pinch          Sensation. Not formally tested. Pt endorses intermittent tingling to ulnar side of L hand/wrist. Denies numbness.      Manual Muscle Test    3/4/2024     Left   Wrist Extension  NT   Wrist Flexion     Radial Deviation     Ulnar Deviation     Supination     Pronation       Treatment     Trisjacob received the treatments listed below:     Supervised modalities after being cleared for contradictions: Fluidotherapy - 115 degrees and 50 speed, to L hand/wrist for 15 minutes to increase blood flow and circulation, desensitization and sensory re-education, pain management, and increased tissue extensibility prior to therex. Pt instructed on performing active range of motion exercises while receiving heat to increase active motion.     Therapeutic activities to improve functional performance for 33 minutes, including:  - Updated objective measurements, see above.   - Performed effelurage massage to L fingers/hand/wrist to decrease edema, improve joint mobility, decrease pain and improve lymphatic drainage to increase hand function.   - Performed scar massage to healed incision to decrease adhesions and improve tensile glide.   - Performed soft tissue mobilization/massage to L forearm to relieve associated soft tissue tightness, improve joint mobility, decrease pain, and improve lymphatic drainage to increase ROM.   - Prayer Stretch 3 x 30 sec holds   - Pron/sup x 20 reps    - Modified wrist dextericiser x 2 min   - Wrist ROM (2 ways - open/closed fist): Wrist flex/ext, RD/UD 2 x 10 reps each   - Finger add/abd, finger lifts x 10 reps each   - Hook to fist x 10 reps  - Pinky slides x 20 reps   - Wrist wheel pron/sup x 2 min   - Issued tubigrip size E to L hand/wrist for edema management, compression, and support.     Patient Education and Home Exercises      Education provided:   - HEP in place  - Progress towards goals      Written Home Exercises Provided: Patient instructed to cont prior HEP.  Exercises were reviewed and Jerome was able to demonstrate them prior to the end of the session.  Jerome demonstrated good  understanding of the HEP provided. See EMR under Patient Instructions for exercises provided during therapy sessions.      ASSESSMENT     Pt participated well and endorses good adherence to HEP. Reports some subjective improvements in function. ROM improving per formal assessment. Remains mostly limited with wrist ext/flex and supination. Will progress as tolerated and per protocol. Emphasized attending therapy per prescription for maximum benefit.     Jerome is progressing well towards her goals and there are no updates to goals at this time. Pt prognosis is Good.     Pt will continue to benefit from skilled outpatient occupational therapy to address the deficits listed in the problem list on initial evaluation, provide pt/family education and to maximize pt's level of independence in the home and community environment.     Pt's spiritual, cultural and educational needs considered and pt agreeable to plan of care and goals.    Anticipated barriers to occupational therapy: attendance    Goals:   The following goals were discussed with the patient and patient is in agreement with them as to be addressed in the treatment plan.   Long Term Goals (LTGs); to be met by discharge.  LTG #1: Pt will report a pain level of 1-3 out of 10 at worst with daily hand use. progressing not met 3/21/2024  LTG #2: Pt will demo improved FOTO score by 20 points. progressing not met 3/21/2024  LTG #3: Pt will return to prior level of function for IADLs and household management. progressing not met 3/21/2024     Short Term Goals (STGs); to be met within 4 weeks (4/5/24).  STG #1: Pt will report a pain level of 4-5 out of 10 at worst with daily hand use. progressing not met 3/21/2024  STG #2: Pt will report/demo [place] with ADLs.  progressing not met 3/21/2024  STG #3: Pt will demonstrate independence with issued HEP, brace wear, and modalities for pain/symptom management. progressing not met 3/21/2024  STG #4: Pt will demo improved L wrist RODRÍGUEZ by 15-30 degrees needed to aid with grooming. Met 3/21/2024  STG #5: Pt will demo improved L forearm RODRÍGUEZ by 20-30 degrees needed to aid with bathing. 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 progressing not met 3/21/2024    PLAN     Continue skilled occupational therapy with individualized plan of care focusing on maximizing functional use of patient's left hand.    Updates/Grading for next session: progress as tolerated and per protocol.    Rika Remy, OTR/L

## 2024-03-22 ENCOUNTER — HOSPITAL ENCOUNTER (OUTPATIENT)
Dept: RADIOLOGY | Facility: HOSPITAL | Age: 44
Discharge: HOME | End: 2024-03-22
Attending: ORTHOPAEDIC SURGERY
Payer: MEDICAID

## 2024-03-22 ENCOUNTER — OFFICE VISIT (OUTPATIENT)
Dept: ORTHOPEDICS | Facility: CLINIC | Age: 44
End: 2024-03-22
Payer: MEDICAID

## 2024-03-22 VITALS — HEIGHT: 65 IN | BODY MASS INDEX: 39.82 KG/M2 | WEIGHT: 239 LBS | RESPIRATION RATE: 16 BRPM

## 2024-03-22 DIAGNOSIS — S52.572A OTH INTARTIC FRACTURE OF LOWER END OF LEFT RADIUS, INIT: Primary | ICD-10-CM

## 2024-03-22 DIAGNOSIS — S52.572A OTH INTARTIC FRACTURE OF LOWER END OF LEFT RADIUS, INIT: ICD-10-CM

## 2024-03-22 PROCEDURE — 99999 PR PBB SHADOW E&M-EST. PATIENT-LVL III: CPT | Mod: PBBFAC,,, | Performed by: ORTHOPAEDIC SURGERY

## 2024-03-22 PROCEDURE — 99024 POSTOP FOLLOW-UP VISIT: CPT | Mod: ,,, | Performed by: ORTHOPAEDIC SURGERY

## 2024-03-22 PROCEDURE — 73110 X-RAY EXAM OF WRIST: CPT | Mod: TC,PO,LT

## 2024-03-22 PROCEDURE — 1159F MED LIST DOCD IN RCRD: CPT | Mod: CPTII,,, | Performed by: ORTHOPAEDIC SURGERY

## 2024-03-22 PROCEDURE — 73110 X-RAY EXAM OF WRIST: CPT | Mod: 26,LT,, | Performed by: RADIOLOGY

## 2024-03-22 PROCEDURE — 99213 OFFICE O/P EST LOW 20 MIN: CPT | Mod: PBBFAC,25,PO | Performed by: ORTHOPAEDIC SURGERY

## 2024-03-22 NOTE — PROGRESS NOTES
Post-op Note    HPI    Jerome Luke is here 6 weeks s/p the following procedure:     Left distal radius ORIF    Overall doing well.  Pain controlled.  In physical therapy.  Making good progress.  Complains of just some soreness with therapy but otherwise doing very well.      Physical Exam:     Patient is alert and oriented no acute distress.   Assistive Device:  Left wrist fracture brace    Left volar wrist Incision(s) are well healed.  There is no evidence of dehiscence.  There is no induration erythema or signs of infection.  Appropriate soft tissue swelling.  Compartments are soft and compressible.  Warm well-perfused extremity.  Able to make composite fist.  30° extension and 25° flexion.  No pain or crepitus with passive range of motion.    Imaging:     I have personally reviewed the following imaging and these are an interpretation of my findings:     X-Ray: I have reviewed all pertinent results/findings and my personal findings are:  Status post ORIF distal radius.  No evidence of complication.  Healing appropriately.    Assessment    Jerome Luke is 6 weeks Post-op     Plan:    Overall doing as expected.  We discussed expectations of surgery and postoperative course.     Pain: Continued postoperative pain regimen -- Tylenol/ibuprofen.    DVT prophylaxis:  None  PT/OT: Continue/Initiate physical therapy (weight bearing status:  5 lb),    Wean out of fracture brace    Follow-up: 6 weeks   X-rays next visit:  Left wrist

## 2024-03-25 ENCOUNTER — CLINICAL SUPPORT (OUTPATIENT)
Dept: REHABILITATION | Facility: HOSPITAL | Age: 44
End: 2024-03-25
Payer: MEDICAID

## 2024-03-25 DIAGNOSIS — R29.898 REDUCED HAND STRENGTH: ICD-10-CM

## 2024-03-25 DIAGNOSIS — M25.60 RANGE OF MOTION DEFICIT: Primary | ICD-10-CM

## 2024-03-25 PROCEDURE — 97530 THERAPEUTIC ACTIVITIES: CPT

## 2024-03-25 NOTE — LETTER
March 25, 2024    Jerome Luke  [address]  [address]  [place] [address]             [facility]  [address] [facility]  [place] [address]  Phone: [phone]   March 25, 2024     Patient: Jerome Luke   YOB: 1980   Date of Visit: 3/25/2024       To Whom it May Concern:    Jerome Luke was seen in my clinic on 3/25/2024. She may return with limitations of not lifting more than 5lbs .    Please excuse her from any classes or work missed.    If you have any questions or concerns, please don't hesitate to call.    Sincerely,         Rika Remy, OTR/L

## 2024-03-25 NOTE — PROGRESS NOTES
[facility]  Occupational Therapy Treatment Note    Date: 3/25/2024  Name: Jerome Luke  [facility] Number: [id number]    Therapy Diagnosis:   Encounter Diagnoses   Name Primary?    Range of motion deficit Yes    Reduced hand strength      Physician: Arturo Arnold MD    Physician Orders: OT Eval and Treat  Medical Diagnosis: S52.572A (ICD-10-CM) - Oth intartic fracture of lower end of left radius, init   Surgical Procedure and Date: Open reduction internal fixation left distal radius fracture, 3+ parts, 02/07/2024 / Date of Injury/Onset: 1/21/24  Evaluation Date: 3/4/2024  Insurance Authorization Period Expiration:   12/31/2024  Plan of Care Expiration: 5/31/24 (12 weeks)   Date of Return to MD: 5/3/2024  Visit # / Visits authorized: 4 / 21  FOTO: 1/3    Precautions: Standard and Weightbearing     Time In: 1:30 pm  Time Out: 2:16 pm  Total Billable Time: 46 minutes    SUBJECTIVE     Pt reports: She was instructed to wean off her splint. She's doing well.   She was compliant with home exercise program given last session.   Response to previous treatment: Good - improving motion and decreased pain  Functional change: using hand for light ADLs at home     Pain: 3-4/10  Location: left wrist    OBJECTIVE   Objective Measures updated at progress report unless specified.    Observation/Appearance: Brace absent, incision healing well.      Elbow and Wrist ROM. Measured in degrees.    3/4/2024 3/4/2024 3/21/2024     Left Right Left   Elbow Ext/Flex WFLs WFLs    Supination/Pronation 20/70 80/70 50/WFL   Wrist Ext/Flex 30/35 60/80 50/42   Wrist RD/UD 15/5 20/30 20/20      Hand ROM. Measured in degrees.    3/4/2024 3/4/2024 3/21/2024     Left Right Left            Digits 2-5: WNLs WNLs             Thumb:                Opposition Touch to base of SF WFL touch to SF DPC WFL touch to SF DPC       Strength (Dynamometer) and Pinch Strength (Pinch Gauge)  Measured in pounds to POP.    3/4/2024  3/4/2024     Left Right   Rung II NT NT   Key Pinch       3pt Pinch       2pt Pinch          Sensation. Not formally tested. Pt endorses intermittent tingling to ulnar side of L hand/wrist. Denies numbness.      Manual Muscle Test    3/4/2024     Left   Wrist Extension  NT   Wrist Flexion     Radial Deviation     Ulnar Deviation     Supination     Pronation       Treatment     Trishell received the treatments listed below:     Supervised modalities after being cleared for contradictions: Fluidotherapy - 115 degrees and 50 speed, to L hand/wrist for 15 minutes to increase blood flow and circulation, desensitization and sensory re-education, pain management, and increased tissue extensibility prior to therex. Pt instructed on performing active range of motion exercises while receiving heat to increase active motion.     Therapeutic activities to improve functional performance for 31 minutes, including:  - Performed effelurage massage to L fingers/hand/wrist to decrease edema, improve joint mobility, decrease pain and improve lymphatic drainage to increase hand function.   - Performed scar massage to healed incision to decrease adhesions and improve tensile glide.   - Performed soft tissue mobilization/massage to L forearm to relieve associated soft tissue tightness, improve joint mobility, decrease pain, and improve lymphatic drainage to increase ROM.   - Prayer Stretch 3 x 30 sec holds   - Pron/sup x 20 reps with PROM  - Wrist dextericiser x 2 min   - Wrist ROM (2 ways - open/closed fist): Wrist flex/ext, RD/UD x 15 reps each with PROM  - Wrist wheel pron/sup, flex/ext x 2 min each with AAROM   - Flexbar oscillations x 2 min  - Michael balance x 2 min   - Light yellow putty gripping x 2 min  - Issued tubigrip size E to L hand/wrist for edema management, compression, and support.     Patient Education and Home Exercises      Education provided:   - HEP in place, added: light gripping with putty, 1 x daily.  - Progress  towards goals     Written Home Exercises Provided: Patient instructed to cont prior HEP.  Exercises were reviewed and Jerome was able to demonstrate them prior to the end of the session.  Jerome demonstrated good  understanding of the HEP provided. See EMR under Patient Instructions for exercises provided during therapy sessions.      ASSESSMENT     Pt participated well and endorses good adherence to HEP. Reports some subjective improvements in function. ROM improving per formal assessment. Remains mostly limited with wrist ext/flex and supination. Initiated light  strengthening with no issue. Will progress as tolerated and per protocol.          Jerome is progressing well towards her goals and there are no updates to goals at this time. Pt prognosis is Good.     Pt will continue to benefit from skilled outpatient occupational therapy to address the deficits listed in the problem list on initial evaluation, provide pt/family education and to maximize pt's level of independence in the home and community environment.     Pt's spiritual, cultural and educational needs considered and pt agreeable to plan of care and goals.    Anticipated barriers to occupational therapy: attendance    Goals:   The following goals were discussed with the patient and patient is in agreement with them as to be addressed in the treatment plan.   Long Term Goals (LTGs); to be met by discharge.  LTG #1: Pt will report a pain level of 1-3 out of 10 at worst with daily hand use. progressing not met 3/25/2024  LTG #2: Pt will demo improved FOTO score by 20 points. progressing not met 3/25/2024  LTG #3: Pt will return to prior level of function for IADLs and household management. progressing not met 3/25/2024     Short Term Goals (STGs); to be met within 4 weeks (4/5/24).  STG #1: Pt will report a pain level of 4-5 out of 10 at worst with daily hand use. progressing not met 3/25/2024  STG #2: Pt will report/demo [place] with ADLs.  progressing not met 3/25/2024  STG #3: Pt will demonstrate independence with issued HEP, brace wear, and modalities for pain/symptom management. progressing not met 3/25/2024  STG #4: Pt will demo improved L wrist RODRÍGUEZ by 15-30 degrees needed to aid with grooming. Met 3/21/2024  STG #5: Pt will demo improved L forearm RODRÍGUEZ by 20-30 degrees needed to aid with bathing. 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 progressing not met 3/25/2024    PLAN     Continue skilled occupational therapy with individualized plan of care focusing on maximizing functional use of patient's left hand.    Updates/Grading for next session: progress as tolerated and per protocol.    Rika Remy, OTR/L

## 2024-04-03 ENCOUNTER — CLINICAL SUPPORT (OUTPATIENT)
Dept: REHABILITATION | Facility: HOSPITAL | Age: 44
End: 2024-04-03
Payer: MEDICAID

## 2024-04-03 DIAGNOSIS — R29.898 REDUCED HAND STRENGTH: ICD-10-CM

## 2024-04-03 DIAGNOSIS — M25.60 RANGE OF MOTION DEFICIT: Primary | ICD-10-CM

## 2024-04-03 PROCEDURE — 97530 THERAPEUTIC ACTIVITIES: CPT

## 2024-04-03 NOTE — PROGRESS NOTES
[facility]  Occupational Therapy Treatment Note    Date: 4/3/2024  Name: Jerome Luke  [facility] Number: [id number]    Therapy Diagnosis:   Encounter Diagnoses   Name Primary?    Range of motion deficit Yes    Reduced hand strength      Physician: Arturo Arnold MD    Physician Orders: OT Eval and Treat  Medical Diagnosis: S52.572A (ICD-10-CM) - Oth intartic fracture of lower end of left radius, init   Surgical Procedure and Date: Open reduction internal fixation left distal radius fracture, 3+ parts, 02/07/2024 / Date of Injury/Onset: 1/21/24  Evaluation Date: 3/4/2024  Insurance Authorization Period Expiration:   12/31/2024  Plan of Care Expiration: 5/31/24 (12 weeks)   Date of Return to MD: 5/3/2024  Visit # / Visits authorized: 5 / 21  FOTO: 2/3    Precautions: Standard and Weightbearing     Time In: 2:35 pm  Time Out: 3:30 pm  Total Billable Time: 55 minutes    SUBJECTIVE     Pt reports: She's doing well.    She was compliant with home exercise program given last session.   Response to previous treatment: Good - improving motion and decreased pain  Functional change: using hand for light ADLs at home; 17 point improvement in FOTO    Pain: 3-4/10  Location: left wrist    OBJECTIVE   Objective Measures updated at progress report unless specified.    Observation/Appearance: Brace absent, incision healing well.      Elbow and Wrist ROM. Measured in degrees.    3/4/2024 3/4/2024 3/21/2024     Left Right Left   Elbow Ext/Flex WFLs WFLs    Supination/Pronation 20/70 80/70 50/WFL   Wrist Ext/Flex 30/35 60/80 50/42   Wrist RD/UD 15/5 20/30 20/20      Hand ROM. Measured in degrees.    3/4/2024 3/4/2024 3/21/2024     Left Right Left            Digits 2-5: WNLs WNLs             Thumb:                Opposition Touch to base of SF WFL touch to SF DPC WFL touch to SF DPC       Strength (Dynamometer) and Pinch Strength (Pinch Gauge)  Measured in pounds to POP.    3/4/2024 3/4/2024     Left  Right   Rung II NT NT   Key Pinch       3pt Pinch       2pt Pinch          Sensation. Not formally tested. Pt endorses intermittent tingling to ulnar side of L hand/wrist. Denies numbness.      Manual Muscle Test    3/4/2024     Left   Wrist Extension  NT   Wrist Flexion     Radial Deviation     Ulnar Deviation     Supination     Pronation       CMS Impairment/Limitation/Restriction for FOTO Wrist Survey    Therapist reviewed FOTO scores for Jerome Luke on 4/3/2024.   FOTO documents entered into [employer] - see Media section.    Limitation Score: 43% (17 points improved)        Treatment     Jerome received the treatments listed below:     Supervised modalities after being cleared for contradictions: Fluidotherapy - 115 degrees and 50 speed, to L hand/wrist for 20 minutes to increase blood flow and circulation, desensitization and sensory re-education, pain management, and increased tissue extensibility prior to therex. Pt instructed on performing active range of motion exercises while receiving heat to increase active motion.     Therapeutic activities to improve functional performance for 35 minutes, including:  - Performed effelurage massage to L fingers/hand/wrist to decrease edema, improve joint mobility, decrease pain and improve lymphatic drainage to increase hand function.   - Performed scar massage to healed incision to decrease adhesions and improve tensile glide.   - Performed soft tissue mobilization/massage to L forearm to relieve associated soft tissue tightness, improve joint mobility, decrease pain, and improve lymphatic drainage to increase ROM.   - Prayer Stretch 3 x 30 sec holds   - Pron/sup x 20 reps with PROM   - Wrist dextericiser x 2 min    - Wrist ROM (2 ways - open/closed fist): Wrist flex/ext, RD/UD x 15 reps each with PROM  - Wrist wheel pron/sup, flex/ext x 2 min each with AAROM   - Flexbar oscillations x 2 min   - Michael balance x 2 min   - Red putty gripping x 2 min   - Issued tubigrip  size E to L hand/wrist for edema management, compression, and support.     Patient Education and Home Exercises      Education provided:   - HEP in place, upgraded to red putty for hand strengthening HEP   - Progress towards goals     Written Home Exercises Provided: Patient instructed to cont prior HEP.  Exercises were reviewed and Jerome was able to demonstrate them prior to the end of the session.  Jerome demonstrated good  understanding of the HEP provided. See EMR under Patient Instructions for exercises provided during therapy sessions.      ASSESSMENT     Pt participated well and endorses good adherence to HEP. Demo 17 point improvement in FOTO. ROM improving per formal assessment. Remains mostly limited with wrist ext and supination. Advanced to light-med  strengthening with no issue. Will progress as tolerated and per protocol.          Jerome is progressing well towards her goals and there are no updates to goals at this time. Pt prognosis is Good.     Pt will continue to benefit from skilled outpatient occupational therapy to address the deficits listed in the problem list on initial evaluation, provide pt/family education and to maximize pt's level of independence in the home and community environment.     Pt's spiritual, cultural and educational needs considered and pt agreeable to plan of care and goals.    Anticipated barriers to occupational therapy: attendance    Goals:   The following goals were discussed with the patient and patient is in agreement with them as to be addressed in the treatment plan.   Long Term Goals (LTGs); to be met by discharge.  LTG #1: Pt will report a pain level of 1-3 out of 10 at worst with daily hand use. progressing not met 4/3/2024  LTG #2: Pt will demo improved FOTO score by 20 points. progressing not met 4/3/2024  LTG #3: Pt will return to prior level of function for IADLs and household management. progressing not met 4/3/2024     Short Term Goals (STGs); to  be met within 4 weeks (4/5/24).  STG #1: Pt will report a pain level of 4-5 out of 10 at worst with daily hand use. progressing not met 4/3/2024  STG #2: Pt will report/demo [place] with ADLs. progressing not met 4/3/2024  STG #3: Pt will demonstrate independence with issued HEP, brace wear, and modalities for pain/symptom management. progressing not met 4/3/2024  STG #4: Pt will demo improved L wrist RODRÍGUEZ by 15-30 degrees needed to aid with grooming. Met 3/21/2024  STG #5: Pt will demo improved L forearm RODRÍGUEZ by 20-30 degrees needed to aid with bathing. 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 progressing not met 4/3/2024    PLAN     Continue skilled occupational therapy with individualized plan of care focusing on maximizing functional use of patient's left hand.    Updates/Grading for next session: progress as tolerated and per protocol.    Rika Remy, OTR/L

## 2024-04-09 ENCOUNTER — CLINICAL SUPPORT (OUTPATIENT)
Dept: REHABILITATION | Facility: HOSPITAL | Age: 44
End: 2024-04-09
Payer: MEDICAID

## 2024-04-09 DIAGNOSIS — M25.60 RANGE OF MOTION DEFICIT: Primary | ICD-10-CM

## 2024-04-09 DIAGNOSIS — R29.898 REDUCED HAND STRENGTH: ICD-10-CM

## 2024-04-09 PROCEDURE — 97530 THERAPEUTIC ACTIVITIES: CPT

## 2024-04-09 NOTE — PROGRESS NOTES
[facility]  Occupational Therapy Treatment Note    Date: 4/9/2024  Name: Jerome Luke  [facility] Number: [id number]    Therapy Diagnosis:   Encounter Diagnoses   Name Primary?    Range of motion deficit Yes    Reduced hand strength      Physician: Arturo Arnold MD    Physician Orders: OT Eval and Treat  Medical Diagnosis: S52.572A (ICD-10-CM) - Oth intartic fracture of lower end of left radius, init   Surgical Procedure and Date: Open reduction internal fixation left distal radius fracture, 3+ parts, 02/07/2024 / Date of Injury/Onset: 1/21/24  Evaluation Date: 3/4/2024  Insurance Authorization Period Expiration:   12/31/2024  Plan of Care Expiration: 5/31/24 (12 weeks)   Date of Return to MD: 5/3/2024  Visit # / Visits authorized: 6 / 21  FOTO: 2/3    Precautions: Standard and Weightbearing     Time In: 2:00 pm  Time Out: 2:43 pm  Total Billable Time: 43 minutes    SUBJECTIVE     Pt reports: She's doing well but forgot about hand strengthening exercises.   She was not compliant with home exercise program given last session.   Response to previous treatment: Good - improving motion and decreased pain  Functional change: using hand for light ADLs at home; 17 point improvement in FOTO    Pain: 2-3/10  Location: left wrist    OBJECTIVE   Objective Measures updated at progress report unless specified.    Observation/Appearance: Brace absent, incision healing well.      Elbow and Wrist ROM. Measured in degrees.    3/4/2024 3/4/2024 3/21/2024     Left Right Left   Elbow Ext/Flex WFLs WFLs    Supination/Pronation 20/70 80/70 50/WFL   Wrist Ext/Flex 30/35 60/80 50/42   Wrist RD/UD 15/5 20/30 20/20      Hand ROM. Measured in degrees.    3/4/2024 3/4/2024 3/21/2024     Left Right Left            Digits 2-5: WNLs WNLs             Thumb:                Opposition Touch to base of SF WFL touch to SF DPC WFL touch to SF DPC       Strength (Dynamometer) and Pinch Strength (Pinch  Gauge)  Measured in pounds to POP.    3/4/2024 3/4/2024     Left Right   Rung II NT NT   Key Pinch       3pt Pinch       2pt Pinch          Sensation. Not formally tested. Pt endorses intermittent tingling to ulnar side of L hand/wrist. Denies numbness.      Manual Muscle Test    3/4/2024     Left   Wrist Extension  NT   Wrist Flexion     Radial Deviation     Ulnar Deviation     Supination     Pronation       Treatment     Trishell received the treatments listed below:     Supervised modalities after being cleared for contradictions: Fluidotherapy - 115 degrees and 50 speed, to L hand/wrist for 20 minutes to increase blood flow and circulation, desensitization and sensory re-education, pain management, and increased tissue extensibility prior to therex. Pt instructed on performing active range of motion exercises while receiving heat to increase active motion.     Therapeutic activities to improve functional performance for 23 minutes, including:  - Performed effelurage massage to L fingers/hand/wrist to decrease edema, improve joint mobility, decrease pain and improve lymphatic drainage to increase hand function.   - Performed scar massage to healed incision to decrease adhesions and improve tensile glide.   - Performed soft tissue mobilization/massage to L forearm to relieve associated soft tissue tightness, improve joint mobility, decrease pain, and improve lymphatic drainage to increase ROM.   - Prayer Stretch 3 x 30 sec holds   - Pron/sup x 20 reps with PROM    - Wrist dextericiser x 2 min    - Wrist flex/ext x 10 reps each   - Wrist ROM (2 ways - open/closed fist): Wrist flex/ext, RD/UD x 15 reps each with PROM *NT  - Wrist wheel pron/sup, flex/ext x 2 min each with AAROM *NT  - Flexbar oscillations x 2 min   - Michael balance x 2 min *NT  - Power web, red: pushes and pulls x 20 reps each way  - Issued Mepitac for scar management   - Red putty gripping x 2 min @ home    Patient Education and Home Exercises       Education provided:   - HEP in place. Emphasized performing HEP per prescription for maximum benefit.   - Progress towards goals     Written Home Exercises Provided: Patient instructed to cont prior HEP.  Exercises were reviewed and Jerome was able to demonstrate them prior to the end of the session.  Jerome demonstrated good  understanding of the HEP provided. See EMR under Patient Instructions for exercises provided during therapy sessions.      ASSESSMENT     Pt participated well and endorses fair adherence to HEP. Emphasized performing HEP per prescription for maximum benefit.   ROM improving per formal assessment. Remains mostly limited with wrist ext and supination. Advanced with light strengthening no issue. Will progress as tolerated and per protocol.          Jerome is progressing well towards her goals and there are no updates to goals at this time. Pt prognosis is Good.     Pt will continue to benefit from skilled outpatient occupational therapy to address the deficits listed in the problem list on initial evaluation, provide pt/family education and to maximize pt's level of independence in the home and community environment.     Pt's spiritual, cultural and educational needs considered and pt agreeable to plan of care and goals.    Anticipated barriers to occupational therapy: attendance    Goals:   The following goals were discussed with the patient and patient is in agreement with them as to be addressed in the treatment plan.   Long Term Goals (LTGs); to be met by discharge.  LTG #1: Pt will report a pain level of 1-3 out of 10 at worst with daily hand use. progressing not met 4/9/2024  LTG #2: Pt will demo improved FOTO score by 20 points. progressing not met 4/9/2024  LTG #3: Pt will return to prior level of function for IADLs and household management. progressing not met 4/9/2024     Short Term Goals (STGs); to be met within 4 weeks (4/5/24).  STG #1: Pt will report a pain level of 4-5  out of 10 at worst with daily hand use. Met 4/9/2024  STG #2: Pt will report/demo [place] with ADLs. progressing not met 4/9/2024  STG #3: Pt will demonstrate independence with issued HEP, brace wear, and modalities for pain/symptom management. Met, ongoing 4/9/2024  STG #4: Pt will demo improved L wrist RODRÍGUEZ by 15-30 degrees needed to aid with grooming. Met 3/21/2024  STG #5: Pt will demo improved L forearm RODRÍGUEZ by 20-30 degrees needed to aid with bathing. 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 progressing not met 4/9/2024    PLAN     Continue skilled occupational therapy with individualized plan of care focusing on maximizing functional use of patient's left hand.    Updates/Grading for next session: progress as tolerated and per protocol. Measure MMT//pinch.     ALEXANDER Rivera

## 2024-04-18 ENCOUNTER — CLINICAL SUPPORT (OUTPATIENT)
Dept: REHABILITATION | Facility: HOSPITAL | Age: 44
End: 2024-04-18
Payer: MEDICAID

## 2024-04-18 DIAGNOSIS — M25.60 RANGE OF MOTION DEFICIT: Primary | ICD-10-CM

## 2024-04-18 DIAGNOSIS — R29.898 REDUCED HAND STRENGTH: ICD-10-CM

## 2024-04-18 PROCEDURE — 97530 THERAPEUTIC ACTIVITIES: CPT

## 2024-04-18 NOTE — PROGRESS NOTES
"[facility]PAT[facility]  Occupational Therapy Treatment Note    Date: 4/18/2024  Name: Jerome Luke  [facility] Number: [id number]    Therapy Diagnosis:   Encounter Diagnoses   Name Primary?    Range of motion deficit Yes    Reduced hand strength        Physician: Arturo Arnold MD    Physician Orders: OT Eval and Treat  Medical Diagnosis: S52.572A (ICD-10-CM) - Oth intartic fracture of lower end of left radius, init   Surgical Procedure and Date: Open reduction internal fixation left distal radius fracture, 3+ parts, 02/07/2024 / Date of Injury/Onset: 1/21/24  Evaluation Date: 3/4/2024  Insurance Authorization Period Expiration:   12/31/2024  Plan of Care Expiration: 5/31/24 (12 weeks)   Date of Return to MD: 5/3/2024  Visit # / Visits authorized: 7 / 21  FOTO: 2/3    Precautions: Standard and Weightbearing     Time In: 2:05 pm  Time Out: 2:53 pm  Total Billable Time: 48 minutes    SUBJECTIVE     Pt reports: "Feeling good." She is washing dishes.    She was not compliant with home exercise program given last session.   Response to previous treatment: Good - improving motion and decreased pain  Functional change: using hand for light ADLs at home; 17 point improvement in FOTO    Pain: 2/10  Location: left wrist    OBJECTIVE   Objective Measures updated at progress report unless specified.    Observation/Appearance: Brace absent, incision healing well.      Elbow and Wrist ROM. Measured in degrees.    3/4/2024 3/4/2024 3/21/2024     Left Right Left   Elbow Ext/Flex WFLs WFLs    Supination/Pronation 20/70 80/70 50/WFL   Wrist Ext/Flex 30/35 60/80 50/42   Wrist RD/UD 15/5 20/30 20/20      Hand ROM. Measured in degrees.    3/4/2024 3/4/2024 3/21/2024     Left Right Left            Digits 2-5: WNLs WNLs             Thumb:                Opposition Touch to base of SF WFL touch to SF DPC WFL touch to SF DPC       Strength (Dynamometer) and Pinch Strength (Pinch Gauge)  Measured in pounds to POP.    " 4/18/2024 4/18/2024     Left Right   Rung II 50 58   Conrad Pinch 14 15.5    3pt Pinch  10 14   2pt Pinch 10.5 11      Sensation. Not formally tested. Pt endorses intermittent tingling to ulnar side of L hand/wrist has ceased. Denies numbness.       Manual Muscle Test    4/18/2024     Left   Wrist Extension  5/5   Wrist Flexion  5/5   Radial Deviation  5/5   Ulnar Deviation  5/5   Supination  5/5   Pronation  5/5     Treatment     Jerome received the treatments listed below:     Supervised modalities after being cleared for contradictions: Fluidotherapy - 115 degrees and 50 speed, to L hand/wrist for 15 minutes to increase blood flow and circulation, desensitization and sensory re-education, pain management, and increased tissue extensibility prior to therex. Pt instructed on performing active range of motion exercises while receiving heat to increase active motion.     Therapeutic activities to improve functional performance for 33 minutes, including:  - Updated objective measurements, see above.   - Performed effelurage massage to L fingers/hand/wrist to decrease edema, improve joint mobility, decrease pain and improve lymphatic drainage to increase hand function.   - Performed scar massage to healed incision to decrease adhesions and improve tensile glide.   - Performed soft tissue mobilization/massage to L forearm to relieve associated soft tissue tightness, improve joint mobility, decrease pain, and improve lymphatic drainage to increase ROM.   - Prayer Stretch 2 x 30 sec holds   - Flexbar, red: pron/sup, flex/ext, RD/UD 2 x 10 reps each   - Hammer pron/sup with 1# x 2 min   - Wrist ROM (2 ways - open/closed fist): Wrist flex/ext, RD/UD x 15 reps each with PROM   - Flexbar oscillations x 2 min   - Power web, red: pushes and pulls x 20 reps each way   - Issued Mepitac for scar management   - Red putty gripping x 2 min @ home    NT:  - Michael balance x 2 min   - Wrist dextericiser x 2 min     Patient Education and  Home Exercises      Education provided:   - HEP in place. Emphasized performing HEP per prescription for maximum benefit.   - Progress towards goals     Written Home Exercises Provided: Patient instructed to cont prior HEP.  Exercises were reviewed and Jerome was able to demonstrate them prior to the end of the session.  Jerome demonstrated good  understanding of the HEP provided. See EMR under Patient Instructions for exercises provided during therapy sessions.      ASSESSMENT     Pt participated well and endorses fair adherence to HEP. Reports improved sensation. Baseline strength measurements taken today demonstrate min to no deficits in strength of affected, left hand. Advanced with light strengthening no issue. Will progress as tolerated and per protocol.          Jerome is progressing well towards her goals and there are no updates to goals at this time. Pt prognosis is Good.     Pt will continue to benefit from skilled outpatient occupational therapy to address the deficits listed in the problem list on initial evaluation, provide pt/family education and to maximize pt's level of independence in the home and community environment.     Pt's spiritual, cultural and educational needs considered and pt agreeable to plan of care and goals.    Anticipated barriers to occupational therapy: attendance    Goals:   The following goals were discussed with the patient and patient is in agreement with them as to be addressed in the treatment plan.   Long Term Goals (LTGs); to be met by discharge.  LTG #1: Pt will report a pain level of 1-3 out of 10 at worst with daily hand use. progressing not met 4/18/2024  LTG #2: Pt will demo improved FOTO score by 20 points. progressing not met 4/18/2024  LTG #3: Pt will return to prior level of function for IADLs and household management. progressing not met 4/18/2024     Short Term Goals (STGs); to be met within 4 weeks (4/5/24).  STG #1: Pt will report a pain level of 4-5  out of 10 at worst with daily hand use. Met 4/9/2024  STG #2: Pt will report/demo [place] with ADLs. progressing not met 4/18/2024  STG #3: Pt will demonstrate independence with issued HEP, brace wear, and modalities for pain/symptom management. Met, ongoing 4/18/2024  STG #4: Pt will demo improved L wrist RODRÍGUEZ by 15-30 degrees needed to aid with grooming. Met 3/21/2024  STG #5: Pt will demo improved L forearm RODRÍGUEZ by 20-30 degrees needed to aid with bathing. 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    Updates/Grading for next session: progress as tolerated and per protocol.     Rika Remy OTR/L

## 2024-04-24 ENCOUNTER — CLINICAL SUPPORT (OUTPATIENT)
Dept: REHABILITATION | Facility: HOSPITAL | Age: 44
End: 2024-04-24
Payer: MEDICAID

## 2024-04-24 DIAGNOSIS — M25.60 RANGE OF MOTION DEFICIT: Primary | ICD-10-CM

## 2024-04-24 DIAGNOSIS — R29.898 REDUCED HAND STRENGTH: ICD-10-CM

## 2024-04-24 PROCEDURE — 97530 THERAPEUTIC ACTIVITIES: CPT

## 2024-04-24 NOTE — PATIENT INSTRUCTIONS
[facility]  OCCUPATIONAL THERAPY  HOME EXERCISE PROGRAM     Complete the following strengthening exercises using 2-3 pound weight.  Do 15 repetitions of each, 1x per day.     Resisted Forearm Rotation  Use a weight or a hammer. Slowly rotate hand to one side then the other.      Resisted Wrist Flexion  With palm up and weight in hand, bend wrist up. Return slowly.      Resisted Wrist Extension  With palm down and weight in hand, bend wrist up. Then bend wrist down.      Resisted Wrist Deviation  With thumb up and weight in hand, bend wrist up. Return slowly.    Copyright © I. All rights reserved.     Therapist: Rika Remy, ANNIKA/NADIA        Normal/Making sense

## 2024-04-24 NOTE — PROGRESS NOTES
"[facility]  Occupational Therapy Treatment Note    Date: 4/24/2024  Name: Jerome Luke  [facility] Number: [id number]    Therapy Diagnosis:   Encounter Diagnoses   Name Primary?    Range of motion deficit Yes    Reduced hand strength      Physician: Arturo Arnold MD    Physician Orders: OT Eval and Treat  Medical Diagnosis: S52.572A (ICD-10-CM) - Oth intartic fracture of lower end of left radius, init   Surgical Procedure and Date: Open reduction internal fixation left distal radius fracture, 3+ parts, 02/07/2024 / Date of Injury/Onset: 1/21/24  Evaluation Date: 3/4/2024  Insurance Authorization Period Expiration:   12/31/2024  Plan of Care Expiration: 5/31/24 (12 weeks)   Date of Return to MD: 5/3/2024  Visit # / Visits authorized: 8 / 21  FOTO: 2/3    Precautions: Standard     Time In: 2:20 pm  Time Out: 3:05 pm  Total Billable Time: 45 minutes    SUBJECTIVE     Pt reports: "Feeling good." Only sore when palpated.   She was not compliant with home exercise program given last session.   Response to previous treatment: Good - improving motion and decreased pain  Functional change: using hand for light ADLs at home; 17 point improvement in FOTO    Pain: 2/10  Location: left wrist    OBJECTIVE   Objective Measures updated at progress report unless specified.    Observation/Appearance: Incision well healed.     Elbow and Wrist ROM. Measured in degrees.    3/4/2024 3/4/2024 3/21/2024     Left Right Left   Elbow Ext/Flex WFLs WFLs    Supination/Pronation 20/70 80/70 50/WFL   Wrist Ext/Flex 30/35 60/80 50/42   Wrist RD/UD 15/5 20/30 20/20      Hand ROM. Measured in degrees.    3/4/2024 3/4/2024 3/21/2024     Left Right Left            Digits 2-5: WNLs WNLs             Thumb:                Opposition Touch to base of SF WFL touch to SF DPC WFL touch to SF DPC       Strength (Dynamometer) and Pinch Strength (Pinch Gauge)  Measured in pounds to POP.    4/18/2024 4/18/2024     Left Right "   Rung II 50 58   Conrad Pinch 14 15.5    3pt Pinch  10 14   2pt Pinch 10.5 11      Sensation. Not formally tested. Pt endorses intermittent tingling to ulnar side of L hand/wrist has ceased. Denies numbness.       Manual Muscle Test    4/18/2024     Left   Wrist Extension  5/5   Wrist Flexion  5/5   Radial Deviation  5/5   Ulnar Deviation  5/5   Supination  5/5   Pronation  5/5     Treatment     Trishell received the treatments listed below:     Supervised modalities after being cleared for contradictions: Fluidotherapy - 115 degrees and 50 speed, to L hand/wrist for 15 minutes to increase blood flow and circulation, desensitization and sensory re-education, pain management, and increased tissue extensibility prior to therex. Pt instructed on performing active range of motion exercises while receiving heat to increase active motion.     Therapeutic activities to improve functional performance for 30 minutes, including:  - Performed effelurage massage to L fingers/hand/wrist to decrease edema, improve joint mobility, decrease pain and improve lymphatic drainage to increase hand function.   - Performed scar massage to healed incision to decrease adhesions and improve tensile glide.   - Performed soft tissue mobilization/massage to L forearm to relieve associated soft tissue tightness, improve joint mobility, decrease pain, and improve lymphatic drainage to increase ROM.   - Prayer Stretch 3 x 30 sec holds   - Flexbar, green: pron/sup, flex/ext, RD/UD x 15 reps each   - Hammer pron/sup with 2.5# x 2 min   - Michael balance x 2 min  - Digi-flex, green: comp  x 20 reps  - Wrist PRE 3 ways with 3# db x 15 reps each  - Flexbar oscillations x 2 min   - Power web, red: pushes and pulls x 20 reps each way   - Red putty gripping x 2 min @ home    NT:  - Wrist dextericiser x 2 min     Patient Education and Home Exercises      Education provided:   - HEP in place, added: Wrist PRE, 1 x daily.  - Progress towards goals     Written  Home Exercises Provided: yes.  Exercises were reviewed and Jerome was able to demonstrate them prior to the end of the session.  Jerome demonstrated good  understanding of the HEP provided. See EMR under Patient Instructions for exercises provided during therapy sessions.      ASSESSMENT     Pt participated well and endorses fair adherence to HEP. Reports improved sensation. Baseline strength measurements demonstrate min to no deficits in strength of affected, left hand. Advanced with strengthening no issue. Will progress as tolerated and per protocol.          Jerome is progressing well towards her goals and there are no updates to goals at this time. Pt prognosis is Good.     Pt will continue to benefit from skilled outpatient occupational therapy to address the deficits listed in the problem list on initial evaluation, provide pt/family education and to maximize pt's level of independence in the home and community environment.     Pt's spiritual, cultural and educational needs considered and pt agreeable to plan of care and goals.    Anticipated barriers to occupational therapy: attendance    Goals:   The following goals were discussed with the patient and patient is in agreement with them as to be addressed in the treatment plan.   Long Term Goals (LTGs); to be met by discharge.  LTG #1: Pt will report a pain level of 1-3 out of 10 at worst with daily hand use. Met 4/24/2024  LTG #2: Pt will demo improved FOTO score by 20 points. progressing not met 4/24/2024  LTG #3: Pt will return to prior level of function for IADLs and household management. progressing not met 4/24/2024     Short Term Goals (STGs); to be met within 4 weeks (4/5/24).  STG #1: Pt will report a pain level of 4-5 out of 10 at worst with daily hand use. Met 4/9/2024  STG #2: Pt will report/demo [place] with ADLs. Met 4/24/2024  STG #3: Pt will demonstrate independence with issued HEP, brace wear, and modalities for pain/symptom  management. Met, ongoing 4/24/2024  STG #4: Pt will demo improved L wrist RODRÍGUEZ by 15-30 degrees needed to aid with grooming. Met 3/21/2024  STG #5: Pt will demo improved L forearm RODRÍGUEZ by 20-30 degrees needed to aid with bathing. 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    Updates/Grading for next session: progress as tolerated and per protocol. Measure.     Rika Remy OTR/L

## 2024-04-30 ENCOUNTER — CLINICAL SUPPORT (OUTPATIENT)
Dept: REHABILITATION | Facility: HOSPITAL | Age: 44
End: 2024-04-30
Payer: MEDICAID

## 2024-04-30 DIAGNOSIS — M25.60 RANGE OF MOTION DEFICIT: Primary | ICD-10-CM

## 2024-04-30 DIAGNOSIS — R29.898 REDUCED HAND STRENGTH: ICD-10-CM

## 2024-04-30 PROCEDURE — 97530 THERAPEUTIC ACTIVITIES: CPT

## 2024-04-30 NOTE — PROGRESS NOTES
"[facility]  Occupational Therapy Treatment Note    Date: 4/30/2024  Name: Jerome Luke  [facility] Number: [id number]    Therapy Diagnosis:   Encounter Diagnoses   Name Primary?    Range of motion deficit Yes    Reduced hand strength      Physician: Arturo Arnold MD    Physician Orders: OT Eval and Treat  Medical Diagnosis: S52.572A (ICD-10-CM) - Oth intartic fracture of lower end of left radius, init   Surgical Procedure and Date: Open reduction internal fixation left distal radius fracture, 3+ parts, 02/07/2024 / Date of Injury/Onset: 1/21/24  Evaluation Date: 3/4/2024  Insurance Authorization Period Expiration:   12/31/2024  Plan of Care Expiration: 5/31/24 (12 weeks)   Date of Return to MD: 5/3/2024  Visit # / Visits authorized: 9 / 21  FOTO: 2/3    Precautions: Standard     Time In: 1:50 pm  Time Out: 2:50 pm  Total Billable Time: 40 minutes    SUBJECTIVE     Pt reports: "I still know that it's broke."   She was not compliant with home exercise program given last session.   Response to previous treatment: Good - improving motion and decreased pain  Functional change: using hand for light ADLs at home; 17 point improvement in FOTO    Pain: 2-3/10   Location: left wrist    OBJECTIVE   Objective Measures updated at progress report unless specified.    Observation/Appearance: Incision well healed.     Elbow and Wrist ROM. Measured in degrees.    3/4/2024 3/4/2024 3/21/2024 4/30/2024     Left Right Left Left   Elbow Ext/Flex WFLs WFLs     Supination/Pronation 20/70 80/70 50/WFL 55/WFL   Wrist Ext/Flex 30/35 60/80 50/42 60/55   Wrist RD/UD 15/5 20/30 20/20 20/20      Hand ROM. Measured in degrees.    3/4/2024 3/4/2024 3/21/2024     Left Right Left            Digits 2-5: WNLs WNLs             Thumb:                Opposition Touch to base of SF WFL touch to SF DPC WFL touch to SF DPC       Strength (Dynamometer) and Pinch Strength (Pinch Gauge)  Measured in pounds to POP.    " 4/18/2024 4/18/2024 4/30/2024     Left Right Left   Rung II 50 58 35   Conrad Pinch 14 15.5  14   3pt Pinch  10 14 8   2pt Pinch 10.5 11 8      Sensation. Not formally tested. Pt endorses intermittent tingling to ulnar side of L hand/wrist has ceased. Denies numbness.       Manual Muscle Test    4/18/2024     Left   Wrist Extension  5/5   Wrist Flexion  5/5   Radial Deviation  5/5   Ulnar Deviation  5/5   Supination  5/5   Pronation  5/5     Treatment     Trisjacob received the treatments listed below:     Supervised modalities after being cleared for contradictions: Fluidotherapy - 115 degrees and 50 speed, to L hand/wrist for 20 minutes to increase blood flow and circulation, desensitization and sensory re-education, pain management, and increased tissue extensibility prior to therex. Pt instructed on performing active range of motion exercises while receiving heat to increase active motion.     Therapeutic activities to improve functional performance for 40 minutes, including:  - Performed effelurage massage to L fingers/hand/wrist to decrease edema, improve joint mobility, decrease pain and improve lymphatic drainage to increase hand function.   - Performed scar massage to healed incision to decrease adhesions and improve tensile glide.   - Performed soft tissue mobilization/massage to L forearm to relieve associated soft tissue tightness, improve joint mobility, decrease pain, and improve lymphatic drainage to increase ROM.   - Prayer Stretch 3 x 30 sec holds   - Flexbar, green: pron/sup, flex/ext, RD/UD x 15 reps each   - Hammer pron/sup with 2.5# x 2 min   - Michael balance x 1 min   - Digi-flex, green: comp  x 20 reps   - Wrist PRE 3 ways with 3# db x 15 reps each *NT  - Flexbar oscillations x 2 min   - Power web, red: pushes and pulls x 20 reps each way   - Red putty gripping x 2 min @ home    NT:  - Wrist dextericiser x 2 min     Patient Education and Home Exercises      Education provided:   - HEP in place,  added: Wrist PRE, 1 x daily.  - Progress towards goals     Written Home Exercises Provided: yes.  Exercises were reviewed and Jerome was able to demonstrate them prior to the end of the session.  Jerome demonstrated good  understanding of the HEP provided. See EMR under Patient Instructions for exercises provided during therapy sessions.      ASSESSMENT     Pt participated fairly and endorses fair adherence to HEP. Reports improved sensation. Some improvements in ROM per formal assessment. Strength measurements re-assessed and demo some regression. Still, continued with strengthening no issue. Will progress as tolerated and per protocol.          Jerome is progressing well towards her goals and there are no updates to goals at this time. Pt prognosis is Good.     Pt will continue to benefit from skilled outpatient occupational therapy to address the deficits listed in the problem list on initial evaluation, provide pt/family education and to maximize pt's level of independence in the home and community environment.     Pt's spiritual, cultural and educational needs considered and pt agreeable to plan of care and goals.    Anticipated barriers to occupational therapy: attendance, sequencing    Goals:   The following goals were discussed with the patient and patient is in agreement with them as to be addressed in the treatment plan.   Long Term Goals (LTGs); to be met by discharge.  LTG #1: Pt will report a pain level of 1-3 out of 10 at worst with daily hand use. Met 4/24/2024  LTG #2: Pt will demo improved FOTO score by 20 points. progressing not met 4/30/2024  LTG #3: Pt will return to prior level of function for IADLs and household management. progressing not met 4/30/2024     Short Term Goals (STGs); to be met within 4 weeks (4/5/24).  STG #1: Pt will report a pain level of 4-5 out of 10 at worst with daily hand use. Met 4/9/2024  STG #2: Pt will report/demo Nolan with ADLs. Met 4/24/2024  STG #3: Pt  will demonstrate independence with issued HEP, brace wear, and modalities for pain/symptom management. Met, ongoing 4/30/2024  STG #4: Pt will demo improved L wrist RODRÍGUEZ by 15-30 degrees needed to aid with grooming. Met 3/21/2024  STG #5: Pt will demo improved L forearm RODRÍGUEZ by 20-30 degrees needed to aid with bathing. 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    Updates/Grading for next session: progress as tolerated and per protocol.     Rika Remy OTR/L

## 2024-05-02 DIAGNOSIS — S52.572A OTH INTARTIC FRACTURE OF LOWER END OF LEFT RADIUS, INIT: Primary | ICD-10-CM

## 2024-05-02 DIAGNOSIS — S52.572A OTHER CLOSED INTRA-ARTICULAR FRACTURE OF DISTAL END OF LEFT RADIUS, INITIAL ENCOUNTER: ICD-10-CM

## 2024-05-03 ENCOUNTER — HOSPITAL ENCOUNTER (OUTPATIENT)
Dept: RADIOLOGY | Facility: HOSPITAL | Age: 44
Discharge: HOME | End: 2024-05-03
Attending: ORTHOPAEDIC SURGERY
Payer: MEDICAID

## 2024-05-03 ENCOUNTER — OFFICE VISIT (OUTPATIENT)
Dept: ORTHOPEDICS | Facility: CLINIC | Age: 44
End: 2024-05-03
Payer: MEDICAID

## 2024-05-03 VITALS — WEIGHT: 239 LBS | HEIGHT: 65 IN | BODY MASS INDEX: 39.82 KG/M2

## 2024-05-03 DIAGNOSIS — S52.572A OTHER CLOSED INTRA-ARTICULAR FRACTURE OF DISTAL END OF LEFT RADIUS, INITIAL ENCOUNTER: ICD-10-CM

## 2024-05-03 DIAGNOSIS — S52.572A OTH INTARTIC FRACTURE OF LOWER END OF LEFT RADIUS, INIT: Primary | ICD-10-CM

## 2024-05-03 DIAGNOSIS — S52.572A OTH INTARTIC FRACTURE OF LOWER END OF LEFT RADIUS, INIT: ICD-10-CM

## 2024-05-03 PROCEDURE — 73110 X-RAY EXAM OF WRIST: CPT | Mod: 26,LT,, | Performed by: RADIOLOGY

## 2024-05-03 PROCEDURE — 73110 X-RAY EXAM OF WRIST: CPT | Mod: TC,PO,LT

## 2024-05-03 PROCEDURE — 99024 POSTOP FOLLOW-UP VISIT: CPT | Mod: ,,, | Performed by: ORTHOPAEDIC SURGERY

## 2024-05-03 PROCEDURE — 99999 PR PBB SHADOW E&M-EST. PATIENT-LVL I: CPT | Mod: PBBFAC,,, | Performed by: ORTHOPAEDIC SURGERY

## 2024-05-03 PROCEDURE — 99211 OFF/OP EST MAY X REQ PHY/QHP: CPT | Mod: PBBFAC,25,PO | Performed by: ORTHOPAEDIC SURGERY

## 2024-05-03 NOTE — PROGRESS NOTES
Post-op Note    HPI    Jerome Luke is here 12 weeks s/p the following procedure:     Left distal radius ORIF    Overall doing well.  Mild pain and stiffness with physical therapy but overall doing well.  In joint physical therapy.  Able to do all activities of daily without any significant restriction.  Range of motion excellent      Physical Exam:     Patient is alert and oriented no acute distress.   Assistive Device:  None    Left volar wrist Incision(s) are well healed.  There is no evidence of dehiscence.  There is no induration erythema or signs of infection.  Very minimal soft tissue swelling.  Compartments are soft and compressible.  Warm well-perfused extremity.  Able to make composite fist.  30° extension and 25° flexion.  No pain or crepitus with passive range of motion.    Imaging:     I have personally reviewed the following imaging and these are an interpretation of my findings:     X-Ray: I have reviewed all pertinent results/findings and my personal findings are:  Status post ORIF distal radius.  No evidence of complication.  Fracture appears healed.  Joint line congruent.    Assessment    Jerome Luke is 12 weeks Post-op     Plan:    Overall doing as expected.  We discussed expectations of surgery and postoperative course.     Pain: Continued postoperative pain regimen -- Tylenol/ibuprofen.    DVT prophylaxis:  None  PT/OT: Continue/Initiate physical therapy, no restriction in regards to weight-bearing      Follow-up:  As needed

## 2024-05-09 ENCOUNTER — CLINICAL SUPPORT (OUTPATIENT)
Dept: REHABILITATION | Facility: HOSPITAL | Age: 44
End: 2024-05-09
Payer: MEDICAID

## 2024-05-09 DIAGNOSIS — M25.60 RANGE OF MOTION DEFICIT: Primary | ICD-10-CM

## 2024-05-09 DIAGNOSIS — R29.898 REDUCED HAND STRENGTH: ICD-10-CM

## 2024-05-09 PROCEDURE — 97530 THERAPEUTIC ACTIVITIES: CPT

## 2024-05-09 NOTE — PROGRESS NOTES
[facility]  Occupational Therapy Treatment Note/Discharge Summary    Date: 5/9/2024  Name: Jerome Luke  Clinic Number: [id number]    Therapy Diagnosis:   Encounter Diagnoses   Name Primary?    Range of motion deficit Yes    Reduced hand strength      Physician: Arturo Arnold MD    Physician Orders: OT Eval and Treat  Medical Diagnosis: S52.572A (ICD-10-CM) - Oth intartic fracture of lower end of left radius, init   Surgical Procedure and Date: Open reduction internal fixation left distal radius fracture, 3+ parts, 02/07/2024 / Date of Injury/Onset: 1/21/24  Evaluation Date: 3/4/2024  Insurance Authorization Period Expiration:   12/31/2024  Plan of Care Expiration: 5/31/24 (12 weeks)   Date of Return to MD: PRN  Visit # / Visits authorized: 10 / 21  FOTO: 3/3    Precautions: Standard     Time In: 3:15 pm  Time Out: 3:55 pm  Total Billable Time: 40 minutes    SUBJECTIVE     Pt reports: Feels ready for discharge. She went kickboxing yesterday.   She was not compliant with home exercise program given last session.   Response to previous treatment: Good - improving motion and decreased pain  Functional change: 58 point improvement in FOTO     Pain: 1/10   Location: left wrist    OBJECTIVE   Objective Measures updated at progress report unless specified.    Observation/Appearance: Incision well healed.     Elbow and Wrist ROM. Measured in degrees.    3/4/2024 3/4/2024 3/21/2024 4/30/2024 5/9/2024     Left Right Left Left Left   Elbow Ext/Flex WFLs WFLs      Supination/Pronation 20/70 80/70 50/WFL 55/WFL 60/WFL   Wrist Ext/Flex 30/35 60/80 50/42 60/55 60/60   Wrist RD/UD 15/5 20/30 20/20 20/20 20/20      Hand ROM. Measured in degrees.    3/4/2024 3/4/2024 3/21/2024     Left Right Left            Digits 2-5: WNLs WNLs             Thumb:                Opposition Touch to base of SF WFL touch to SF DPC WFL touch to SF DPC       Strength (Dynamometer) and Pinch Strength (Pinch  Gauge)  Measured in pounds to POP.    4/18/2024 4/18/2024 4/30/2024 5/9/2024     Left Right Left Left   Rung II 50 58 35 40   Conrad Pinch 14 15.5  14 15   3pt Pinch  10 14 8 12   2pt Pinch 10.5 11 8 10      Sensation. Not formally tested. Pt endorses intermittent tingling to ulnar side of L hand/wrist has ceased. Denies numbness.       Manual Muscle Test    4/18/2024     Left   Wrist Extension  5/5   Wrist Flexion  5/5   Radial Deviation  5/5   Ulnar Deviation  5/5   Supination  5/5   Pronation  5/5     CMS Impairment/Limitation/Restriction for FOTO Wrist Survey    Therapist reviewed FOTO scores for Jerome Luke on 5/9/2024.   FOTO documents entered into [employer] - see Media section.    Limitation Score: 2% (58 points improved)       Treatment     Jerome received the treatments listed below:     Supervised modalities after being cleared for contradictions: Fluidotherapy - 115 degrees and 50 speed, to L hand/wrist for 20 minutes to increase blood flow and circulation, desensitization and sensory re-education, pain management, and increased tissue extensibility prior to therex. Pt instructed on performing active range of motion exercises while receiving heat to increase active motion.     Therapeutic activities to improve functional performance for 20 minutes, including:  - Updated objective measurements, see above.   - Performed effelurage massage to L fingers/hand/wrist to decrease edema, improve joint mobility, decrease pain and improve lymphatic drainage to increase hand function.   - Performed scar massage to healed incision to decrease adhesions and improve tensile glide.   - Performed soft tissue mobilization/massage to L forearm to relieve associated soft tissue tightness, improve joint mobility, decrease pain, and improve lymphatic drainage to increase ROM.   - Power web, red: pushes and pulls x 20 reps each way   - Red putty gripping x 2 min @ home    Patient Education and Home Exercises      Education  provided:   - HEP in place  - Progress towards goals     Written Home Exercises Provided: Patient instructed to cont prior HEP.  Exercises were reviewed and Jerome was able to demonstrate them prior to the end of the session.  Jerome demonstrated good  understanding of the HEP provided. See EMR under Patient Instructions for exercises provided during therapy sessions.      ASSESSMENT     Jerome had good tolerance to treatment this date. Pt has attended outpatient OT services since 3/4/2024 (9 weeks) and has met all goals at this time. Pt has made significant gains with therapy, demonstrating return to PLOF, with improved ROM, increased hand strength, and decreased pain. She demo 58 point improvement in FOTO. She is independent with HEP and is appropriate for discharge from OT services at this time.     Pt's spiritual, cultural and educational needs considered and pt agreeable to plan of care and goals.    Goals:   The following goals were discussed with the patient and patient is in agreement with them as to be addressed in the treatment plan.   Long Term Goals (LTGs); to be met by discharge.  LTG #1: Pt will report a pain level of 1-3 out of 10 at worst with daily hand use. Met 4/24/2024  LTG #2: Pt will demo improved FOTO score by 20 points. Met 5/9/2024  LTG #3: Pt will return to prior level of function for IADLs and household management. Met 5/9/2024     Short Term Goals (STGs); to be met within 4 weeks (4/5/24).  STG #1: Pt will report a pain level of 4-5 out of 10 at worst with daily hand use. Met 4/9/2024  STG #2: Pt will report/demo [place] with ADLs. Met 4/24/2024  STG #3: Pt will demonstrate independence with issued HEP, brace wear, and modalities for pain/symptom management. Met 5/9/2024  STG #4: Pt will demo improved L wrist RODRÍGUEZ by 15-30 degrees needed to aid with grooming. Met 3/21/2024  STG #5: Pt will demo improved L forearm RODRÍGUEZ by 20-30 degrees needed to aid with bathing. 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 Met 4/18/2024    Discharge reason: Patient has met all of his/her goals and Patient requested discharge    PLAN     This patient is discharged from Outpatient Occupational Therapy Services.     Rika Remy, OTR/L

## 2025-01-09 DIAGNOSIS — Z12.31 ENCOUNTER FOR SCREENING MAMMOGRAM FOR MALIGNANT NEOPLASM OF BREAST: Primary | ICD-10-CM

## 2025-02-15 ENCOUNTER — HOSPITAL ENCOUNTER (EMERGENCY)
Facility: HOSPITAL | Age: 45
Discharge: HOME | End: 2025-02-15
Attending: EMERGENCY MEDICINE
Payer: MEDICAID

## 2025-02-15 VITALS
WEIGHT: 200 LBS | DIASTOLIC BLOOD PRESSURE: 75 MMHG | OXYGEN SATURATION: 97 % | HEIGHT: 65 IN | HEART RATE: 84 BPM | BODY MASS INDEX: 33.32 KG/M2 | SYSTOLIC BLOOD PRESSURE: 144 MMHG | RESPIRATION RATE: 16 BRPM | TEMPERATURE: 98 F

## 2025-02-15 DIAGNOSIS — R11.2 NAUSEA VOMITING AND DIARRHEA: Primary | ICD-10-CM

## 2025-02-15 DIAGNOSIS — E87.6 HYPOKALEMIA: ICD-10-CM

## 2025-02-15 DIAGNOSIS — T50.905A ADVERSE EFFECT OF DRUG, INITIAL ENCOUNTER: ICD-10-CM

## 2025-02-15 DIAGNOSIS — R19.7 NAUSEA VOMITING AND DIARRHEA: Primary | ICD-10-CM

## 2025-02-15 LAB
ALBUMIN SERPL BCP-MCNC: 3.5 G/DL (ref 3.5–5.2)
ALP SERPL-CCNC: 53 U/L (ref 40–150)
ALT SERPL W/O P-5'-P-CCNC: 10 U/L (ref 10–44)
ANION GAP SERPL CALC-SCNC: 11 MMOL/L (ref 8–16)
ANISOCYTOSIS BLD QL SMEAR: SLIGHT
AST SERPL-CCNC: 21 U/L (ref 10–40)
B-HCG UR QL: NEGATIVE
B-OH-BUTYR BLD STRIP-SCNC: 0.1 MMOL/L (ref 0–0.5)
BACTERIA #/AREA URNS AUTO: ABNORMAL /HPF
BASOPHILS # BLD AUTO: 0.04 K/UL (ref 0–0.2)
BASOPHILS NFR BLD: 0.6 % (ref 0–1.9)
BILIRUB SERPL-MCNC: 0.5 MG/DL (ref 0.1–1)
BILIRUB UR QL STRIP: NEGATIVE
BUN SERPL-MCNC: 10 MG/DL (ref 6–20)
CALCIUM SERPL-MCNC: 10.5 MG/DL (ref 8.7–10.5)
CHLORIDE SERPL-SCNC: 103 MMOL/L (ref 95–110)
CLARITY UR REFRACT.AUTO: ABNORMAL
CO2 SERPL-SCNC: 23 MMOL/L (ref 23–29)
COLOR UR AUTO: YELLOW
CREAT SERPL-MCNC: 0.8 MG/DL (ref 0.5–1.4)
CTP QC/QA: YES
DIFFERENTIAL METHOD BLD: ABNORMAL
EOSINOPHIL # BLD AUTO: 0 K/UL (ref 0–0.5)
EOSINOPHIL NFR BLD: 0.4 % (ref 0–8)
ERYTHROCYTE [DISTWIDTH] IN BLOOD BY AUTOMATED COUNT: 17.4 % (ref 11.5–14.5)
EST. GFR  (NO RACE VARIABLE): >60 ML/MIN/1.73 M^2
GLUCOSE SERPL-MCNC: 81 MG/DL (ref 70–110)
GLUCOSE UR QL STRIP: NEGATIVE
HCT VFR BLD AUTO: 31.2 % (ref 37–48.5)
HGB BLD-MCNC: 9.5 G/DL (ref 12–16)
HGB UR QL STRIP: ABNORMAL
HYALINE CASTS UR QL AUTO: 0 /LPF
HYPOCHROMIA BLD QL SMEAR: ABNORMAL
IMM GRANULOCYTES # BLD AUTO: 0.06 K/UL (ref 0–0.04)
IMM GRANULOCYTES NFR BLD AUTO: 0.9 % (ref 0–0.5)
KETONES UR QL STRIP: ABNORMAL
LEUKOCYTE ESTERASE UR QL STRIP: ABNORMAL
LIPASE SERPL-CCNC: 29 U/L (ref 4–60)
LYMPHOCYTES # BLD AUTO: 2 K/UL (ref 1–4.8)
LYMPHOCYTES NFR BLD: 30.2 % (ref 18–48)
MCH RBC QN AUTO: 22.2 PG (ref 27–31)
MCHC RBC AUTO-ENTMCNC: 30.4 G/DL (ref 32–36)
MCV RBC AUTO: 73 FL (ref 82–98)
MICROSCOPIC COMMENT: ABNORMAL
MONOCYTES # BLD AUTO: 0.4 K/UL (ref 0.3–1)
MONOCYTES NFR BLD: 6.6 % (ref 4–15)
NEUTROPHILS # BLD AUTO: 4.1 K/UL (ref 1.8–7.7)
NEUTROPHILS NFR BLD: 61.3 % (ref 38–73)
NITRITE UR QL STRIP: NEGATIVE
NRBC BLD-RTO: 0 /100 WBC
OVALOCYTES BLD QL SMEAR: ABNORMAL
PH UR STRIP: 6 [PH] (ref 5–8)
PLATELET # BLD AUTO: 236 K/UL (ref 150–450)
PLATELET BLD QL SMEAR: ABNORMAL
PMV BLD AUTO: 11.2 FL (ref 9.2–12.9)
POIKILOCYTOSIS BLD QL SMEAR: SLIGHT
POLYCHROMASIA BLD QL SMEAR: ABNORMAL
POTASSIUM SERPL-SCNC: 3 MMOL/L (ref 3.5–5.1)
PROT SERPL-MCNC: 7.3 G/DL (ref 6–8.4)
PROT UR QL STRIP: ABNORMAL
RBC # BLD AUTO: 4.28 M/UL (ref 4–5.4)
RBC #/AREA URNS AUTO: 3 /HPF (ref 0–4)
SCHISTOCYTES BLD QL SMEAR: ABNORMAL
SODIUM SERPL-SCNC: 137 MMOL/L (ref 136–145)
SP GR UR STRIP: 1.03 (ref 1–1.03)
SQUAMOUS #/AREA URNS AUTO: 23 /HPF
URN SPEC COLLECT METH UR: ABNORMAL
WBC # BLD AUTO: 6.68 K/UL (ref 3.9–12.7)
WBC #/AREA URNS AUTO: 13 /HPF (ref 0–5)

## 2025-02-15 PROCEDURE — 25000003 PHARM REV CODE 250: Performed by: PHYSICIAN ASSISTANT

## 2025-02-15 PROCEDURE — 85025 COMPLETE CBC W/AUTO DIFF WBC: CPT | Performed by: PHYSICIAN ASSISTANT

## 2025-02-15 PROCEDURE — 81025 URINE PREGNANCY TEST: CPT | Performed by: PHYSICIAN ASSISTANT

## 2025-02-15 PROCEDURE — 82010 KETONE BODYS QUAN: CPT | Performed by: EMERGENCY MEDICINE

## 2025-02-15 PROCEDURE — 96374 THER/PROPH/DIAG INJ IV PUSH: CPT

## 2025-02-15 PROCEDURE — 80053 COMPREHEN METABOLIC PANEL: CPT | Performed by: PHYSICIAN ASSISTANT

## 2025-02-15 PROCEDURE — 83690 ASSAY OF LIPASE: CPT | Performed by: PHYSICIAN ASSISTANT

## 2025-02-15 PROCEDURE — 99284 EMERGENCY DEPT VISIT MOD MDM: CPT | Mod: 25

## 2025-02-15 PROCEDURE — 81001 URINALYSIS AUTO W/SCOPE: CPT | Performed by: PHYSICIAN ASSISTANT

## 2025-02-15 PROCEDURE — 63600175 PHARM REV CODE 636 W HCPCS: Performed by: PHYSICIAN ASSISTANT

## 2025-02-15 PROCEDURE — 87086 URINE CULTURE/COLONY COUNT: CPT | Performed by: PHYSICIAN ASSISTANT

## 2025-02-15 RX ORDER — ONDANSETRON 4 MG/1
4 TABLET, ORALLY DISINTEGRATING ORAL EVERY 6 HOURS PRN
Qty: 10 TABLET | Refills: 0 | Status: SHIPPED | OUTPATIENT
Start: 2025-02-15

## 2025-02-15 RX ORDER — POTASSIUM CHLORIDE 20 MEQ/1
40 TABLET, EXTENDED RELEASE ORAL ONCE
Status: COMPLETED | OUTPATIENT
Start: 2025-02-15 | End: 2025-02-15

## 2025-02-15 RX ORDER — ONDANSETRON HYDROCHLORIDE 2 MG/ML
4 INJECTION, SOLUTION INTRAVENOUS
Status: COMPLETED | OUTPATIENT
Start: 2025-02-15 | End: 2025-02-15

## 2025-02-15 RX ADMIN — ONDANSETRON 4 MG: 2 INJECTION INTRAMUSCULAR; INTRAVENOUS at 07:02

## 2025-02-15 RX ADMIN — POTASSIUM CHLORIDE 40 MEQ: 1500 TABLET, EXTENDED RELEASE ORAL at 08:02

## 2025-02-15 RX ADMIN — SODIUM CHLORIDE 1000 ML: 9 INJECTION, SOLUTION INTRAVENOUS at 07:02

## 2025-02-16 NOTE — ED TRIAGE NOTES
Jerome Luke, a 44 y.o. female presents to the ED w/ complaint of nausea/ vomiting x 2 weeks.     Was on adipex, then switched to ozempic, stopped that did intermittent fasting. Started back on semaglutide 2 weeks ago and now having trouble keeping food and drink down.     Pt denies blood in emesis. On average 2 episodes of emesis a day.     Triage note:  Chief Complaint   Patient presents with    Nausea    Vomiting     Arrived to Ed via pov from home with complaint of nausea and vomiting x 2 weeks. Pt states she was recently prescribed semaglutide.      Review of patient's allergies indicates:  No Known Allergies  Past Medical History:   Diagnosis Date    Anxiety     Bipolar 1 disorder     Depression

## 2025-02-16 NOTE — ED PROVIDER NOTES
"Encounter Date: 2/15/2025       History     Chief Complaint   Patient presents with    Nausea    Vomiting     Arrived to Ed via pov from home with complaint of nausea and vomiting x 2 weeks. Pt states she was recently prescribed semaglutide.      The patient is a 44 year old female. She has a past medical history of bipolar disorder, anxiety, depression, and obesity. She states that she discontinued receiving weekly Ozempic injections after reaching her targeted weight loss goals last year, but then gained some weight back and recently resumed getting the injections 2 weeks ago. She states that since she resuming the medication, she has had frequent episodes of abdominal cramps, nausea, vomiting, loose stools and GI Upset. She has been taking Pepto-Bismol at home but otherwise denies any attempted pre-arrival treatment. Currently she denies having any abdominal pain, but does report intermittent nausea. She is here requesting "something for my stomach to calm down". No additional symptoms or concerns reported.        Review of patient's allergies indicates:  No Known Allergies  Past Medical History:   Diagnosis Date    Anxiety     Bipolar 1 disorder     Depression      Past Surgical History:   Procedure Laterality Date    COLONOSCOPY N/A 9/15/2022    Procedure: COLONOSCOPY;  Surgeon: Jerrell Silva MD;  Location: [facility];  Service: General;  Laterality: N/A;    OPEN REDUCTION AND INTERNAL FIXATION (ORIF) OF FRACTURE OF DISTAL RADIUS Left 2/7/2024    Procedure: ORIF, FRACTURE, RADIUS, DISTAL;  Surgeon: Arturo Arnold MD;  Location: [facility];  Service: Orthopedics;  Laterality: Left;  OSTEOMED  Dwayne verified 2/5/24 ark     Family History   Problem Relation Name Age of Onset    Hypertension Mother      Diabetes Maternal Aunt      Cancer Maternal Grandmother      Breast cancer Neg Hx      Colon cancer Neg Hx      Ovarian cancer Neg Hx       Social History[1]  Review of Systems   Constitutional:  Positive for " appetite change. Negative for chills and fever.   Respiratory:  Negative for cough and shortness of breath.    Cardiovascular:  Negative for chest pain and palpitations.   Gastrointestinal:  Positive for abdominal pain, diarrhea, nausea and vomiting. Negative for abdominal distention, blood in stool and constipation.   Genitourinary:  Negative for decreased urine volume, difficulty urinating, flank pain, frequency, menstrual problem and pelvic pain.   Musculoskeletal:  Negative for back pain, myalgias and neck pain.   Skin:  Negative for color change and rash.   Allergic/Immunologic: Negative for immunocompromised state.   Neurological:  Negative for dizziness, syncope, weakness, light-headedness, numbness and headaches.   Psychiatric/Behavioral:  Negative for confusion.        Physical Exam     Initial Vitals [02/15/25 1758]   BP Pulse Resp Temp SpO2   (!) 158/76 92 16 98.2 °F (36.8 °C) 99 %      MAP       --         Physical Exam    Nursing note and vitals reviewed.  Constitutional: She appears well-developed and well-nourished. She is not diaphoretic. No distress.   HENT:   Head: Normocephalic.   Eyes: Conjunctivae are normal.   Neck: Neck supple.   Cardiovascular:  Normal rate.           Pulmonary/Chest: No respiratory distress.   Abdominal: Abdomen is soft. She exhibits no distension. There is no abdominal tenderness.   Benign abdomen  There is no rebound and no guarding.   Musculoskeletal:         General: Normal range of motion.      Cervical back: Neck supple.     Neurological: She is alert and oriented to person, place, and time. She has normal strength. No sensory deficit.   Skin: Skin is warm and dry.   Psychiatric: She has a normal mood and affect. Her behavior is normal.         ED Course   Procedures  Labs Reviewed   URINALYSIS, REFLEX TO URINE CULTURE - Abnormal       Result Value    Specimen UA Urine, Clean Catch      Color, UA Yellow      Appearance, UA Hazy (*)     pH, UA 6.0      Specific Gravity,  UA 1.030      Protein, UA 1+ (*)     Glucose, UA Negative      Ketones, UA Trace (*)     Bilirubin (UA) Negative      Occult Blood UA Trace (*)     Nitrite, UA Negative      Leukocytes, UA 3+ (*)     Narrative:     Specimen Source->Urine   CBC W/ AUTO DIFFERENTIAL - Abnormal    WBC 6.68      RBC 4.28      Hemoglobin 9.5 (*)     Hematocrit 31.2 (*)     MCV 73 (*)     MCH 22.2 (*)     MCHC 30.4 (*)     RDW 17.4 (*)     Platelets 236      MPV 11.2      Immature Granulocytes 0.9 (*)     Gran # (ANC) 4.1      Immature Grans (Abs) 0.06 (*)     Lymph # 2.0      Mono # 0.4      Eos # 0.0      Baso # 0.04      nRBC 0      Gran % 61.3      Lymph % 30.2      Mono % 6.6      Eosinophil % 0.4      Basophil % 0.6      Platelet Estimate Appears normal      Aniso Slight      Poik Slight      Poly Occasional      Hypo Occasional      Ovalocytes Occasional      Fragmented Cells Occasional      Differential Method Automated     COMPREHENSIVE METABOLIC PANEL - Abnormal    Sodium 137      Potassium 3.0 (*)     Chloride 103      CO2 23      Glucose 81      BUN 10      Creatinine 0.8      Calcium 10.5      Total Protein 7.3      Albumin 3.5      Total Bilirubin 0.5      Alkaline Phosphatase 53      AST 21      ALT 10      eGFR >60.0      Anion Gap 11     URINALYSIS MICROSCOPIC - Abnormal    RBC, UA 3      WBC, UA 13 (*)     Bacteria Occasional      Squam Epithel, UA 23      Hyaline Casts, UA 0      Microscopic Comment SEE COMMENT      Narrative:     Specimen Source->Urine   CULTURE, URINE   LIPASE    Lipase 29     BETA - HYDROXYBUTYRATE, SERUM    Beta-Hydroxybutyrate 0.1     POCT URINE PREGNANCY    POC Preg Test, Ur Negative       Acceptable Yes            Imaging Results    None          Medications   sodium chloride 0.9% bolus 1,000 mL 1,000 mL (1,000 mLs Intravenous New Bag 2/15/25 1948)   ondansetron injection 4 mg (4 mg Intravenous Given 2/15/25 1945)   potassium chloride SA CR tablet 40 mEq (40 mEq Oral Given 2/15/25  2026)     Medical Decision Making  Amount and/or Complexity of Data Reviewed  Labs: ordered.    Risk  Prescription drug management.                          Medical Decision Making:   Initial Assessment:   45 yo F, recently resumed weekly Ozempic injections 2 weeks ago and has had intermittent abdominal discomfort, N/V/D since   Differential Diagnosis:   Medication effect, abdominal pain, nausea, vomiting, diarrhea, electrolyte derangement, etc    Clinical Tests:   Lab Tests: Ordered and Reviewed  ED Management:  Vital signs reviewed   Chart review completed   Labs reviewed - mild hypokalemia   PO potassium supplement given   Pt tolerating PO   Pt reports feeling better after IV fluids, IV Zofran  Suspect GI symptoms 2/2 recently starting Ozempic     Pt advised to rest, hydrate, and to follow up closely with her PCP   Return precautions advised            Clinical Impression:  Final diagnoses:  [R11.2, R19.7] Nausea vomiting and diarrhea (Primary)  [T50.905A] Adverse effect of drug, initial encounter  [E87.6] Hypokalemia          ED Disposition Condition    Discharge Stable          ED Prescriptions       Medication Sig Dispense Start Date End Date Auth. Provider    ondansetron (ZOFRAN-ODT) 4 MG TbDL Take 1 tablet (4 mg total) by mouth every 6 (six) hours as needed (nausea). 10 tablet 2/15/2025 -- Felix Santizo PA-C          Follow-up Information       Follow up With Specialties Details Why Contact Bere Ken NP Family Medicine Schedule an appointment as soon as possible for a visit  Rest, hydrate, and follow up with your regular physician in the next 1-2 days for re-evaluation and further management. [address] [facility] [address]  [phone]      [facility] Emergency Medicine  Return to the ER promptly if unimproved or if worse in any way. [address]  [place] [address] [phone]                 [1]   Social  History  Tobacco Use    Smoking status: Never     Passive exposure: Yes    Smokeless tobacco: Never   Substance Use Topics    Alcohol use: Yes     Comment: casually     Drug use: Yes     Frequency: 1.0 times per week     Types: Marijuana        Felix Santizo PA-C  02/15/25 [id number]

## 2025-02-17 LAB
BACTERIA UR CULT: NORMAL
BACTERIA UR CULT: NORMAL

## (undated) DEVICE — GLOVE SENSICARE PI GRN 8

## (undated) DEVICE — BANDAGE MATRIX HK LOOP 4IN 5YD

## (undated) DEVICE — DRAPE INVISISHIELD TOWEL SMALL

## (undated) DEVICE — SUT ETHILON 3-0 PS2 18 BLK

## (undated) DEVICE — YANKAUER OPEN TIP W/O VENT

## (undated) DEVICE — SPONGE BULKEE II ABSRB 6X6.75

## (undated) DEVICE — STRAP OR TABLE 5IN X 72IN

## (undated) DEVICE — SUT 2-0 VICRYL / SH (J417)

## (undated) DEVICE — TOWEL OR DISP STRL BLUE 4/PK

## (undated) DEVICE — PAD CAST SPECIALIST STRL 4

## (undated) DEVICE — INSTRUMENT FRAZIER 10FR W/VENT

## (undated) DEVICE — 2.0MM DRILL BIT

## (undated) DEVICE — PACK CUSTOM UNIV BASIN SLI

## (undated) DEVICE — DRAPE C ARM 42 X 120 10/BX

## (undated) DEVICE — Device

## (undated) DEVICE — DRAPE U SPLIT SHEET 54X76IN

## (undated) DEVICE — APPLICATOR CHLORAPREP ORN 26ML

## (undated) DEVICE — GOWN POLY REINF BRTH SLV XL

## (undated) DEVICE — GLOVE SENSICARE PI ALOE 7.5

## (undated) DEVICE — SLING ORTHOPEDIC LARGE

## (undated) DEVICE — SPLINT PLASTER F.S 4INX15IN

## (undated) DEVICE — ELECTRODE REM PLYHSV RETURN 9

## (undated) DEVICE — BANDAGE ESMARK ELASTIC ST 4X9

## (undated) DEVICE — DRAPE THREE-QTR REINF 53X77IN

## (undated) DEVICE — SUT 2/0 18IN COATED VICRYL

## (undated) DEVICE — PACK SIRUS BASIC V SURG STRL

## (undated) DEVICE — DRESSING XEROFORM NONADH 1X8IN

## (undated) DEVICE — ALCOHOL 70% ANTISEPTIC ISO 4OZ

## (undated) DEVICE — TOURNIQUET SB QC DP 18X4IN

## (undated) DEVICE — LINER SUCTION 3000CC

## (undated) DEVICE — SOL NACL IRR 1000ML BTL

## (undated) DEVICE — DRAPE HAND STERILE

## (undated) DEVICE — SLEEVE SCD EXPRESS KNEE MEDIUM